# Patient Record
Sex: MALE | Race: OTHER | Employment: FULL TIME | URBAN - METROPOLITAN AREA
[De-identification: names, ages, dates, MRNs, and addresses within clinical notes are randomized per-mention and may not be internally consistent; named-entity substitution may affect disease eponyms.]

---

## 2017-01-04 ENCOUNTER — OFFICE VISIT (OUTPATIENT)
Dept: INTERNAL MEDICINE CLINIC | Facility: CLINIC | Age: 32
End: 2017-01-04

## 2017-01-04 VITALS
DIASTOLIC BLOOD PRESSURE: 99 MMHG | HEART RATE: 123 BPM | TEMPERATURE: 98 F | BODY MASS INDEX: 35 KG/M2 | WEIGHT: 239.19 LBS | SYSTOLIC BLOOD PRESSURE: 141 MMHG

## 2017-01-04 DIAGNOSIS — R07.89 ATYPICAL CHEST PAIN: ICD-10-CM

## 2017-01-04 DIAGNOSIS — K21.9 GASTROESOPHAGEAL REFLUX DISEASE, ESOPHAGITIS PRESENCE NOT SPECIFIED: Primary | ICD-10-CM

## 2017-01-04 PROCEDURE — 99214 OFFICE O/P EST MOD 30 MIN: CPT | Performed by: INTERNAL MEDICINE

## 2017-01-04 PROCEDURE — 99212 OFFICE O/P EST SF 10 MIN: CPT | Performed by: INTERNAL MEDICINE

## 2017-01-04 RX ORDER — RANITIDINE 300 MG/1
300 TABLET ORAL NIGHTLY
Qty: 30 TABLET | Refills: 5 | Status: SHIPPED | OUTPATIENT
Start: 2017-01-04 | End: 2017-02-01

## 2017-01-04 RX ORDER — PANTOPRAZOLE SODIUM 40 MG/1
40 TABLET, DELAYED RELEASE ORAL
Qty: 30 TABLET | Refills: 5 | Status: SHIPPED | OUTPATIENT
Start: 2017-01-04 | End: 2017-02-01

## 2017-01-04 NOTE — PROGRESS NOTES
HPI:    Patient ID: Jony Norman is a 33year old male. Chest Pressure  This is a new problem. The current episode started more than 1 month ago. The problem occurs every several days. The problem has been unchanged.  Pertinent negatives include no abd every morning before breakfast. Disp: 30 tablet Rfl: 5   Metoprolol Succinate  MG Oral Tablet 24 Hr Take 1 tablet (100 mg total) by mouth daily. Disp: 30 tablet Rfl: 11   lisinopril 20 MG Oral Tab Take 1 tablet (20 mg total) by mouth daily.  Disp: 30 Patient has tried taking Prevacid without any improvement. On exam: Physical exam unremarkable. Chest pain due to GERD. Plan:  -Prescribed Pantoprazole sodium 40 mg for initial treatment.  Patient was instructed to follow up if no improvement.   -Follow

## 2017-02-01 ENCOUNTER — OFFICE VISIT (OUTPATIENT)
Dept: INTERNAL MEDICINE CLINIC | Facility: CLINIC | Age: 32
End: 2017-02-01

## 2017-02-01 VITALS
WEIGHT: 234.19 LBS | HEART RATE: 108 BPM | BODY MASS INDEX: 35 KG/M2 | DIASTOLIC BLOOD PRESSURE: 83 MMHG | SYSTOLIC BLOOD PRESSURE: 129 MMHG | TEMPERATURE: 98 F

## 2017-02-01 DIAGNOSIS — I10 ESSENTIAL HYPERTENSION WITH GOAL BLOOD PRESSURE LESS THAN 130/80: ICD-10-CM

## 2017-02-01 DIAGNOSIS — I10 ESSENTIAL HYPERTENSION: Primary | ICD-10-CM

## 2017-02-01 DIAGNOSIS — K21.9 GASTROESOPHAGEAL REFLUX DISEASE, ESOPHAGITIS PRESENCE NOT SPECIFIED: ICD-10-CM

## 2017-02-01 PROCEDURE — 99212 OFFICE O/P EST SF 10 MIN: CPT | Performed by: INTERNAL MEDICINE

## 2017-02-01 PROCEDURE — 99214 OFFICE O/P EST MOD 30 MIN: CPT | Performed by: INTERNAL MEDICINE

## 2017-02-01 RX ORDER — LISINOPRIL 20 MG/1
20 TABLET ORAL DAILY
Qty: 90 TABLET | Refills: 3 | Status: SHIPPED | OUTPATIENT
Start: 2017-02-01 | End: 2017-05-18

## 2017-02-01 RX ORDER — RANITIDINE 300 MG/1
300 TABLET ORAL NIGHTLY
Qty: 90 TABLET | Refills: 3 | Status: SHIPPED | OUTPATIENT
Start: 2017-02-01 | End: 2018-02-02 | Stop reason: ALTCHOICE

## 2017-02-01 RX ORDER — PANTOPRAZOLE SODIUM 40 MG/1
40 TABLET, DELAYED RELEASE ORAL
Qty: 90 TABLET | Refills: 3 | Status: SHIPPED | OUTPATIENT
Start: 2017-02-01 | End: 2018-02-02 | Stop reason: ALTCHOICE

## 2017-02-01 RX ORDER — METOPROLOL SUCCINATE 100 MG/1
100 TABLET, EXTENDED RELEASE ORAL DAILY
Qty: 90 TABLET | Refills: 3 | Status: SHIPPED | OUTPATIENT
Start: 2017-02-01 | End: 2017-03-17

## 2017-02-01 NOTE — PROGRESS NOTES
HPI:    Patient ID: Elke Delgado is a 33year old male. Gastro-esophageal Reflux  He reports no abdominal pain, no chest pain or no heartburn. This is a chronic problem.  The current episode started more than 1 year ago (Diagnosed last visit on 1/4/201 Pantoprazole Sodium 40 MG Oral Tab EC Take 1 tablet (40 mg total) by mouth every morning before breakfast. Disp: 90 tablet Rfl: 3   Metoprolol Succinate  MG Oral Tablet 24 Hr Take 1 tablet (100 mg total) by mouth daily.  Disp: 90 tablet Rfl: 3   lis Meds This Visit:  Signed Prescriptions Disp Refills    RaNITidine HCl 300 MG Oral Tab 90 tablet 3      Sig: Take 1 tablet (300 mg total) by mouth nightly.       Pantoprazole Sodium 40 MG Oral Tab EC 90 tablet 3      Sig: Take 1 tablet (40 mg total) by m

## 2017-03-16 ENCOUNTER — TELEPHONE (OUTPATIENT)
Dept: INTERNAL MEDICINE CLINIC | Facility: CLINIC | Age: 32
End: 2017-03-16

## 2017-03-16 DIAGNOSIS — I10 ESSENTIAL HYPERTENSION WITH GOAL BLOOD PRESSURE LESS THAN 130/80: Primary | ICD-10-CM

## 2017-03-16 NOTE — TELEPHONE ENCOUNTER
Pharm has already transferred the RX to the new Location   [facility]   Pharm want to verify the directions   Current Outpatient Prescriptions:  Metoprolol Succinate  MG Oral Tablet 24 Hr Take 1 tablet (100 mg total) by mouth daily.  Disp: 90 tablet Rfl: 3

## 2017-03-17 RX ORDER — METOPROLOL SUCCINATE 100 MG/1
100 TABLET, EXTENDED RELEASE ORAL DAILY
Qty: 90 TABLET | Refills: 3 | Status: SHIPPED | OUTPATIENT
Start: 2017-03-17 | End: 2017-07-21

## 2017-05-16 DIAGNOSIS — I10 ESSENTIAL HYPERTENSION WITH GOAL BLOOD PRESSURE LESS THAN 130/80: Primary | ICD-10-CM

## 2017-05-17 ENCOUNTER — TELEPHONE (OUTPATIENT)
Dept: INTERNAL MEDICINE CLINIC | Facility: CLINIC | Age: 32
End: 2017-05-17

## 2017-05-17 RX ORDER — LISINOPRIL 20 MG/1
TABLET ORAL
Qty: 30 TABLET | Refills: 1 | OUTPATIENT
Start: 2017-05-17

## 2017-05-17 NOTE — TELEPHONE ENCOUNTER
Patient reports that his [facility] has informed him that he has no refills remaining. He had script transferred from [place], but there appears to be a problem with the transfer as only a portion of the refills were available to use.  Please ass

## 2017-05-17 NOTE — TELEPHONE ENCOUNTER
Patient states that at [facility] it has  and he does not have no medication there   Please call [facility] for his blood pressure medication  He has only 1 pill left  Please call [facility] for medication below

## 2017-05-18 RX ORDER — LISINOPRIL 20 MG/1
20 TABLET ORAL DAILY
Qty: 90 TABLET | Refills: 3 | Status: SHIPPED | OUTPATIENT
Start: 2017-05-18 | End: 2017-10-06

## 2017-07-03 ENCOUNTER — HOSPITAL ENCOUNTER (OUTPATIENT)
Age: 32
Discharge: HOME | End: 2017-07-03
Attending: EMERGENCY MEDICINE
Payer: COMMERCIAL

## 2017-07-03 VITALS
OXYGEN SATURATION: 97 % | SYSTOLIC BLOOD PRESSURE: 137 MMHG | RESPIRATION RATE: 16 BRPM | WEIGHT: 225 LBS | BODY MASS INDEX: 33 KG/M2 | DIASTOLIC BLOOD PRESSURE: 88 MMHG | TEMPERATURE: 99 F | HEART RATE: 105 BPM

## 2017-07-03 DIAGNOSIS — J02.9 ACUTE VIRAL PHARYNGITIS: ICD-10-CM

## 2017-07-03 DIAGNOSIS — J06.9 UPPER RESPIRATORY TRACT INFECTION, UNSPECIFIED TYPE: Primary | ICD-10-CM

## 2017-07-03 LAB — S PYO AG THROAT QL: NEGATIVE

## 2017-07-03 PROCEDURE — 87430 STREP A AG IA: CPT

## 2017-07-03 PROCEDURE — 99213 OFFICE O/P EST LOW 20 MIN: CPT

## 2017-07-03 PROCEDURE — 99214 OFFICE O/P EST MOD 30 MIN: CPT

## 2017-07-03 RX ORDER — CODEINE PHOSPHATE AND GUAIFENESIN 10; 100 MG/5ML; MG/5ML
5 SOLUTION ORAL EVERY 6 HOURS PRN
Qty: 120 ML | Refills: 0 | Status: SHIPPED | OUTPATIENT
Start: 2017-07-03 | End: 2017-07-07 | Stop reason: ALTCHOICE

## 2017-07-03 RX ORDER — CETIRIZINE HYDROCHLORIDE 10 MG/1
10 TABLET ORAL DAILY
Qty: 30 TABLET | Refills: 0 | Status: SHIPPED | OUTPATIENT
Start: 2017-07-03 | End: 2017-07-12

## 2017-07-03 NOTE — ED INITIAL ASSESSMENT (HPI)
Pt here to IC with c/o sore, itching throat for 3 days. Pt also states he has a cough with  greenish phlegm. Resp easy and regular. Pt denies any fever.

## 2017-07-03 NOTE — ED PROVIDER NOTES
Patient Seen in: [facility] [place]    History   No chief complaint on file. Stated Complaint: sore throat    HPI    58-year-old male presents for complaint of sore throat and cough for the past 3 days.   Patient states that c sore throat. Eyes: Negative. Respiratory: Positive for cough. Cardiovascular: Negative. Gastrointestinal: Negative. Genitourinary: Negative. Musculoskeletal: Negative. Skin: Negative. Neurological: Negative.     All other systems rev Course  ------------------------------------------------------------  MDM    Patient is non-toxic, well hydrated, tolerating PO and in no respiratory distress. Airway is patent and patient is tolerating secretions without difficulty. Rapid strep is neg.

## 2017-07-03 NOTE — ED NOTES
Home instructions given to Pt. Pt verbalizes understanding of home care and follow up care. Rx x2 given to pt.

## 2017-07-07 ENCOUNTER — OFFICE VISIT (OUTPATIENT)
Dept: INTERNAL MEDICINE CLINIC | Facility: CLINIC | Age: 32
End: 2017-07-07

## 2017-07-07 ENCOUNTER — TELEPHONE (OUTPATIENT)
Dept: INTERNAL MEDICINE CLINIC | Facility: CLINIC | Age: 32
End: 2017-07-07

## 2017-07-07 VITALS
OXYGEN SATURATION: 98 % | HEART RATE: 99 BPM | SYSTOLIC BLOOD PRESSURE: 134 MMHG | TEMPERATURE: 98 F | DIASTOLIC BLOOD PRESSURE: 91 MMHG | WEIGHT: 238.63 LBS | BODY MASS INDEX: 35 KG/M2

## 2017-07-07 DIAGNOSIS — J06.9 ACUTE URI: Primary | ICD-10-CM

## 2017-07-07 PROCEDURE — 99214 OFFICE O/P EST MOD 30 MIN: CPT | Performed by: INTERNAL MEDICINE

## 2017-07-07 PROCEDURE — 99212 OFFICE O/P EST SF 10 MIN: CPT | Performed by: INTERNAL MEDICINE

## 2017-07-07 RX ORDER — AZITHROMYCIN 250 MG/1
TABLET, FILM COATED ORAL
Qty: 1 PACKAGE | Refills: 0 | Status: SHIPPED | OUTPATIENT
Start: 2017-07-07 | End: 2017-07-12 | Stop reason: ALTCHOICE

## 2017-07-07 NOTE — PROGRESS NOTES
HPI:    Patient ID: Carter Cespedes is a 27year old male. Cough   This is a new problem. The current episode started in the past 7 days. The problem has been unchanged. The cough is non-productive. Associated symptoms include a sore throat.  Pertinent ne Tab Take 1 tablet (300 mg total) by mouth nightly.  Disp: 90 tablet Rfl: 3   Pantoprazole Sodium 40 MG Oral Tab EC Take 1 tablet (40 mg total) by mouth every morning before breakfast. Disp: 90 tablet Rfl: 3     Allergies:No Known Allergies   PHYSICAL EXAM: made by the scribe were at my direction and in my presence. I have reviewed the chart and discharge instructions (if applicable) and agree that the record reflects my personal performance and is accurate and complete.   Kay Mares MD, 7/7/2017, 10:09 AM

## 2017-07-07 NOTE — TELEPHONE ENCOUNTER
Pt had bad cough, Not getting better,seen at urgent care last monday. Hard to breath. Like to be seen today. Pl advise.

## 2017-07-08 NOTE — TELEPHONE ENCOUNTER
Chart notes that pt was seen in the office by Dr. Zachariah Zamora yesterday and treated with Z-pack. No further action required at present.

## 2017-07-11 ENCOUNTER — TELEPHONE (OUTPATIENT)
Dept: FAMILY MEDICINE CLINIC | Facility: CLINIC | Age: 32
End: 2017-07-11

## 2017-07-11 NOTE — TELEPHONE ENCOUNTER
Actions Requested: Pt requesting rx to help with cough. Cough, noticed Right eye is becoming red and feels pain when coughing. Having problems sleeping. Green to cloudy white phlegm. States after coughing has a runny nose for a couple minutes.  Pt states ha sounds very sick or weak to the triager  * Coughed up > 1 tablespoon (15 ml) blood (Exception: blood-tinged sputum)  * Fever > 103 F (39.4 C)  * Fever > 100.5 F (38.1 C) and over 61years of age  * Fever > 100.5 F (38.1 C) and has diabetes mellitus or a we

## 2017-07-12 ENCOUNTER — OFFICE VISIT (OUTPATIENT)
Dept: INTERNAL MEDICINE CLINIC | Facility: CLINIC | Age: 32
End: 2017-07-12

## 2017-07-12 VITALS
SYSTOLIC BLOOD PRESSURE: 127 MMHG | TEMPERATURE: 97 F | HEIGHT: 69 IN | RESPIRATION RATE: 16 BRPM | DIASTOLIC BLOOD PRESSURE: 83 MMHG | BODY MASS INDEX: 35.55 KG/M2 | HEART RATE: 86 BPM | WEIGHT: 240 LBS

## 2017-07-12 DIAGNOSIS — J20.9 ACUTE BRONCHITIS, UNSPECIFIED ORGANISM: Primary | ICD-10-CM

## 2017-07-12 DIAGNOSIS — R09.81 NASAL CONGESTION: ICD-10-CM

## 2017-07-12 PROBLEM — I10 ESSENTIAL HYPERTENSION: Status: ACTIVE | Noted: 2017-07-12

## 2017-07-12 PROCEDURE — 99213 OFFICE O/P EST LOW 20 MIN: CPT | Performed by: STUDENT IN AN ORGANIZED HEALTH CARE EDUCATION/TRAINING PROGRAM

## 2017-07-12 RX ORDER — BENZONATATE 200 MG/1
200 CAPSULE ORAL 3 TIMES DAILY PRN
Qty: 30 CAPSULE | Refills: 0 | Status: SHIPPED | OUTPATIENT
Start: 2017-07-12 | End: 2017-10-20

## 2017-07-12 RX ORDER — PREDNISONE 20 MG/1
20 TABLET ORAL 2 TIMES DAILY
Qty: 14 TABLET | Refills: 0 | Status: SHIPPED | OUTPATIENT
Start: 2017-07-12 | End: 2017-07-19

## 2017-07-12 RX ORDER — FLUTICASONE PROPIONATE 50 MCG
1 SPRAY, SUSPENSION (ML) NASAL DAILY
Qty: 1 BOTTLE | Refills: 0 | Status: SHIPPED | OUTPATIENT
Start: 2017-07-12 | End: 2017-07-19

## 2017-07-12 NOTE — PROGRESS NOTES
HPI:    Patient ID: Moe George is a 27year old male. Cough   This is a new problem. Episode onset: for the past 2 weeks. The problem has been unchanged. The problem occurs every few hours. The cough is productive of sputum.  Associated symptoms incl tablet (300 mg total) by mouth nightly.  Disp: 90 tablet Rfl: 3   Pantoprazole Sodium 40 MG Oral Tab EC Take 1 tablet (40 mg total) by mouth every morning before breakfast. Disp: 90 tablet Rfl: 3     Allergies:No Known Allergies   PHYSICAL EXAM:   Physical effects, risk and benefits discussed in length. After visit instructions are provided to the patient. The patient indicates understanding of these issues and agrees to the plan. The patient is asked to return if symptoms persist or worsen.     No orders o

## 2017-07-12 NOTE — TELEPHONE ENCOUNTER
Pt very upset that he has not heard back from the doctor and that doctor won't answer his calls. Appt made with Dr. Elvira Jaimes for today. Pt unable to sleep due to cough. Pt requesting number for pride line. Number given.

## 2017-07-20 ENCOUNTER — TELEPHONE (OUTPATIENT)
Dept: INTERNAL MEDICINE CLINIC | Facility: CLINIC | Age: 32
End: 2017-07-20

## 2017-07-20 NOTE — TELEPHONE ENCOUNTER
Pt was seen on 7/12/17 for bronchitis - has been taking medication but is still coughing and having chest pain.  Please c/b to advise treatment options

## 2017-07-21 ENCOUNTER — OFFICE VISIT (OUTPATIENT)
Dept: INTERNAL MEDICINE CLINIC | Facility: CLINIC | Age: 32
End: 2017-07-21

## 2017-07-21 VITALS
DIASTOLIC BLOOD PRESSURE: 84 MMHG | OXYGEN SATURATION: 97 % | SYSTOLIC BLOOD PRESSURE: 118 MMHG | HEIGHT: 69 IN | HEART RATE: 105 BPM | WEIGHT: 241 LBS | BODY MASS INDEX: 35.7 KG/M2 | RESPIRATION RATE: 16 BRPM | TEMPERATURE: 99 F

## 2017-07-21 DIAGNOSIS — I10 ESSENTIAL HYPERTENSION WITH GOAL BLOOD PRESSURE LESS THAN 130/80: ICD-10-CM

## 2017-07-21 DIAGNOSIS — J40 BRONCHITIS: Primary | ICD-10-CM

## 2017-07-21 PROCEDURE — 99214 OFFICE O/P EST MOD 30 MIN: CPT | Performed by: STUDENT IN AN ORGANIZED HEALTH CARE EDUCATION/TRAINING PROGRAM

## 2017-07-21 RX ORDER — METOPROLOL SUCCINATE 25 MG/1
25 TABLET, EXTENDED RELEASE ORAL DAILY
Qty: 30 TABLET | Refills: 0 | Status: SHIPPED | OUTPATIENT
Start: 2017-07-21 | End: 2017-09-01

## 2017-07-21 RX ORDER — BUDESONIDE AND FORMOTEROL FUMARATE DIHYDRATE 80; 4.5 UG/1; UG/1
2 AEROSOL RESPIRATORY (INHALATION) 2 TIMES DAILY
Qty: 1 INHALER | Refills: 0 | COMMUNITY
Start: 2017-07-21 | End: 2017-07-28

## 2017-07-21 NOTE — PROGRESS NOTES
HPI:    Patient ID: Samreen Faustin is a 27year old male. Cough   This is a chronic problem. The current episode started 1 to 4 weeks ago. The problem has been waxing and waning. The problem occurs hourly. The cough is non-productive.  Pertinent negative Negative. Genitourinary: Negative. Musculoskeletal: Negative for arthralgias and myalgias. Skin: Negative. Allergic/Immunologic: Negative. Negative for environmental allergies. Neurological: Negative. Hematological: Negative.     Psychiatri He has normal reflexes. Skin: Skin is warm and dry. Psychiatric: He has a normal mood and affect. His behavior is normal. Judgment and thought content normal.   Nursing note and vitals reviewed.              ASSESSMENT/PLAN:   Bronchitis  (primary encou

## 2017-07-21 NOTE — TELEPHONE ENCOUNTER
Spoke with patient and he c/o productive cough that is not getting better, chest congestion, SOB and difficultybreathing when he gets \"cough attacks\". Patient is also c/o abdominal pain, but thinks it might be due to the medications he has been taking.

## 2017-08-02 ENCOUNTER — OFFICE VISIT (OUTPATIENT)
Dept: INTERNAL MEDICINE CLINIC | Facility: CLINIC | Age: 32
End: 2017-08-02

## 2017-08-02 ENCOUNTER — HOSPITAL ENCOUNTER (OUTPATIENT)
Dept: GENERAL RADIOLOGY | Age: 32
Discharge: HOME | End: 2017-08-02
Attending: STUDENT IN AN ORGANIZED HEALTH CARE EDUCATION/TRAINING PROGRAM
Payer: COMMERCIAL

## 2017-08-02 VITALS
OXYGEN SATURATION: 98 % | HEART RATE: 90 BPM | TEMPERATURE: 98 F | HEIGHT: 69 IN | BODY MASS INDEX: 35.7 KG/M2 | RESPIRATION RATE: 16 BRPM | SYSTOLIC BLOOD PRESSURE: 124 MMHG | DIASTOLIC BLOOD PRESSURE: 86 MMHG | WEIGHT: 241 LBS

## 2017-08-02 DIAGNOSIS — R07.89 ACUTE CHEST WALL PAIN: ICD-10-CM

## 2017-08-02 DIAGNOSIS — Z23 NEED FOR DIPHTHERIA-TETANUS-PERTUSSIS (TDAP) VACCINE, ADULT/ADOLESCENT: ICD-10-CM

## 2017-08-02 DIAGNOSIS — R07.89 ACUTE CHEST WALL PAIN: Primary | ICD-10-CM

## 2017-08-02 DIAGNOSIS — M94.0 COSTOCHONDRITIS, ACUTE: ICD-10-CM

## 2017-08-02 PROCEDURE — 90471 IMMUNIZATION ADMIN: CPT | Performed by: STUDENT IN AN ORGANIZED HEALTH CARE EDUCATION/TRAINING PROGRAM

## 2017-08-02 PROCEDURE — 99213 OFFICE O/P EST LOW 20 MIN: CPT | Performed by: STUDENT IN AN ORGANIZED HEALTH CARE EDUCATION/TRAINING PROGRAM

## 2017-08-02 PROCEDURE — 90715 TDAP VACCINE 7 YRS/> IM: CPT | Performed by: STUDENT IN AN ORGANIZED HEALTH CARE EDUCATION/TRAINING PROGRAM

## 2017-08-02 PROCEDURE — 71020 XR CHEST PA + LAT CHEST (CPT=71020): CPT | Performed by: STUDENT IN AN ORGANIZED HEALTH CARE EDUCATION/TRAINING PROGRAM

## 2017-08-02 RX ORDER — NAPROXEN 500 MG/1
500 TABLET ORAL 2 TIMES DAILY WITH MEALS
Qty: 14 TABLET | Refills: 0 | Status: SHIPPED | OUTPATIENT
Start: 2017-08-02 | End: 2018-02-02 | Stop reason: ALTCHOICE

## 2017-08-02 NOTE — PATIENT INSTRUCTIONS
Chest Wall Pain: Costochondritis    The chest pain that you have had today is caused by costochondritis. This condition is caused by an inflammation of the cartilage joining your ribs to your breastbone.  It is not caused by heart or lung problems. The in · A change in the type of pain. Call if it feels different, becomes more serious, lasts longer, or spreads into your shoulder, arm, neck, jaw, or back.   · Shortness of breath or pain gets worse when you breathe  · Weakness, dizziness, or fainting  · Cough

## 2017-08-02 NOTE — PROGRESS NOTES
[facility]    REASON FOR VISIT:    Current Complaints: Patient presents with:  Hypertension: 2 wk f/u  Chest Pain: Upper Right side      HPI:  J Carlos Deleon is a 27year old male who presents for right sided chest wall pain for the past 2 weeks, Negative for arthralgias and gait problem. Right side chest wall pain. Skin: Negative for color change and rash.      EXAM:   /86   Pulse 90   Temp 98 °F (36.7 °C) (Oral)   Resp 16   Ht 69\"   Wt 241 lb   SpO2 98%   BMI 35.59 kg/m²    Wt Readings fro total) by mouth 2 (two) times daily with meals.     Need for diphtheria-tetanus-pertussis (Tdap) vaccine, adult/adolescent  -     TETANUS, DIPHTHERIA TOXOIDS AND ACELLULAR PERTUSIS VACCINE (TDAP), >7 YEARS, IM USE      Medications side effects, risk and christiana

## 2017-09-01 DIAGNOSIS — I10 ESSENTIAL HYPERTENSION WITH GOAL BLOOD PRESSURE LESS THAN 130/80: ICD-10-CM

## 2017-09-01 RX ORDER — METOPROLOL SUCCINATE 25 MG/1
25 TABLET, EXTENDED RELEASE ORAL DAILY
Qty: 30 TABLET | Refills: 0 | Status: SHIPPED | OUTPATIENT
Start: 2017-09-01 | End: 2017-10-01

## 2017-09-01 NOTE — TELEPHONE ENCOUNTER
Pt asking for refill of Metoprolol Succinate ER 25 MG - send to [facility] on file.

## 2017-10-01 DIAGNOSIS — I10 ESSENTIAL HYPERTENSION WITH GOAL BLOOD PRESSURE LESS THAN 130/80: ICD-10-CM

## 2017-10-02 RX ORDER — METOPROLOL SUCCINATE 25 MG/1
TABLET, EXTENDED RELEASE ORAL
Qty: 30 TABLET | Refills: 0 | Status: SHIPPED | OUTPATIENT
Start: 2017-10-02 | End: 2017-11-04

## 2017-10-02 NOTE — TELEPHONE ENCOUNTER
Rx sent for 1 month per failed protocol. The pt did state is completely out of the metoprolol. Pt was reminded to schedule an appointment. An appointment was scheduled on 10/6/2017 with Dr. Dale Boyer at 8:00.

## 2017-10-06 ENCOUNTER — OFFICE VISIT (OUTPATIENT)
Dept: INTERNAL MEDICINE CLINIC | Facility: CLINIC | Age: 32
End: 2017-10-06

## 2017-10-06 VITALS
RESPIRATION RATE: 16 BRPM | HEART RATE: 88 BPM | DIASTOLIC BLOOD PRESSURE: 88 MMHG | TEMPERATURE: 98 F | HEIGHT: 69 IN | BODY MASS INDEX: 35.99 KG/M2 | SYSTOLIC BLOOD PRESSURE: 126 MMHG | OXYGEN SATURATION: 98 % | WEIGHT: 243 LBS

## 2017-10-06 DIAGNOSIS — I10 ESSENTIAL HYPERTENSION WITH GOAL BLOOD PRESSURE LESS THAN 130/80: Primary | ICD-10-CM

## 2017-10-06 DIAGNOSIS — Z23 NEED FOR INFLUENZA VACCINATION: ICD-10-CM

## 2017-10-06 DIAGNOSIS — R05.8 UPPER AIRWAY COUGH SYNDROME: ICD-10-CM

## 2017-10-06 PROCEDURE — 90686 IIV4 VACC NO PRSV 0.5 ML IM: CPT | Performed by: STUDENT IN AN ORGANIZED HEALTH CARE EDUCATION/TRAINING PROGRAM

## 2017-10-06 PROCEDURE — 99214 OFFICE O/P EST MOD 30 MIN: CPT | Performed by: STUDENT IN AN ORGANIZED HEALTH CARE EDUCATION/TRAINING PROGRAM

## 2017-10-06 PROCEDURE — 90471 IMMUNIZATION ADMIN: CPT | Performed by: STUDENT IN AN ORGANIZED HEALTH CARE EDUCATION/TRAINING PROGRAM

## 2017-10-06 RX ORDER — LISINOPRIL 10 MG/1
10 TABLET ORAL DAILY
Qty: 30 TABLET | Refills: 0 | Status: SHIPPED | OUTPATIENT
Start: 2017-10-06 | End: 2017-11-06

## 2017-10-06 RX ORDER — LORATADINE 10 MG/1
10 TABLET ORAL DAILY
Qty: 30 TABLET | Refills: 0 | Status: SHIPPED | OUTPATIENT
Start: 2017-10-06 | End: 2019-03-18 | Stop reason: ALTCHOICE

## 2017-10-06 NOTE — PATIENT INSTRUCTIONS
Taking Your Blood Pressure  Blood pressure is the force of blood against the artery wall as it moves from the heart through the blood vessels. You can take your own blood pressure reading using a digital monitor.  Take readings as often as your healthcare · Write down your blood pressure numbers for each reading. Note the date and time. Keep your results in one place, such as a notebook. Even if your monitor has a built-in memory, keep a hard copy of the readings. · Remove the cuff from your arm.  Turn off A cough that is worse at night may be due to postnasal drip. Excess mucus in the nose drains from the back of your nose to your throat. This triggers the cough reflex. Postnasal drip may be due to a sinus infection or allergy.  Common allergens include dust The esophagus is the tube that carries food from the mouth to the stomach. A valve at its lower end prevents stomach acids from flowing upward. If this valve does not work properly, acid from the stomach enters the esophagus.  This may cause a burning pain

## 2017-10-06 NOTE — PROGRESS NOTES
[facility]    REASON FOR VISIT:    Current Complaints: Patient presents with:  Blood Pressure      HPI:  J Carlos Deleon is a 27year old male who presents for HTN f/u.  He was monitoring his BP at home, numbers reported [id number]'U systolic and 46-3 nausea, vomiting, abdominal pain and diarrhea. Genitourinary: Negative for urgency, frequency and difficulty urinating. Musculoskeletal: Negative for arthralgias and gait problem. Skin: Negative for color change and rash.      EXAM:   /88   Puls Tab; Take 1 tablet (10 mg total) by mouth daily. Upper airway cough syndrome  Trial of OTC antihistamine, continue Flonase.   Cannot completely rule out Lisinopril as a cause of his cough,   patient was on this ACEI for about 2 years now and cough starte

## 2017-10-20 ENCOUNTER — OFFICE VISIT (OUTPATIENT)
Dept: INTERNAL MEDICINE CLINIC | Facility: CLINIC | Age: 32
End: 2017-10-20

## 2017-10-20 VITALS
BODY MASS INDEX: 35.99 KG/M2 | WEIGHT: 243 LBS | OXYGEN SATURATION: 98 % | RESPIRATION RATE: 16 BRPM | SYSTOLIC BLOOD PRESSURE: 122 MMHG | DIASTOLIC BLOOD PRESSURE: 78 MMHG | HEART RATE: 88 BPM | HEIGHT: 69 IN | TEMPERATURE: 98 F

## 2017-10-20 DIAGNOSIS — E55.9 VITAMIN D DEFICIENCY: ICD-10-CM

## 2017-10-20 DIAGNOSIS — Z13.0 SCREENING FOR IRON DEFICIENCY ANEMIA: ICD-10-CM

## 2017-10-20 DIAGNOSIS — Z13.1 SCREENING FOR DIABETES MELLITUS: ICD-10-CM

## 2017-10-20 DIAGNOSIS — Z13.89 SCREENING FOR GENITOURINARY CONDITION: ICD-10-CM

## 2017-10-20 DIAGNOSIS — I10 ESSENTIAL HYPERTENSION: Primary | ICD-10-CM

## 2017-10-20 DIAGNOSIS — Z13.29 SCREENING FOR THYROID DISORDER: ICD-10-CM

## 2017-10-20 DIAGNOSIS — Z13.220 SCREENING FOR LIPOID DISORDERS: ICD-10-CM

## 2017-10-20 DIAGNOSIS — Z13.228 SCREENING FOR METABOLIC DISORDER: ICD-10-CM

## 2017-10-20 PROCEDURE — 99213 OFFICE O/P EST LOW 20 MIN: CPT | Performed by: STUDENT IN AN ORGANIZED HEALTH CARE EDUCATION/TRAINING PROGRAM

## 2017-10-20 NOTE — PATIENT INSTRUCTIONS
Eating Heart-Healthy Foods  Eating has a big impact on your heart health. In fact, eating healthier can improve several of your heart risks at once. For instance, it helps you manage weight, cholesterol, and blood pressure.  Here are ideas to help you mingo Aim to make these foods staples of your diet. If you have diabetes, you may have different recommendations than what is listed here:  · Fruits and vegetable provide plenty of nutrients without a lot of calories.  At meals, fill half your plate with these fo · Choose ingredients that spice up your food without adding calories, fat, or sodium. Try these items: horseradish, hot sauce, lemon, mustard, nonfat salad dressings, and vinegar.  For salt-free herbs and spices, try basil, cilantro, cinnamon, pepper, and r

## 2017-10-20 NOTE — PROGRESS NOTES
[facility]    REASON FOR VISIT:    Current Complaints: Patient presents with:  Hypertension: 2 wk f/u      HPI:  Kitty Dumont is a 27year old male who presents for 1 month f/u for HTN.  At the previous visit his BP medications were adjusted wit for arthralgias and gait problem. Skin: Negative for color change and rash.      EXAM:   /78   Pulse 88   Temp 98.1 °F (36.7 °C) (Oral)   Resp 16   Ht 69\"   Wt 243 lb   SpO2 98%   BMI 35.88 kg/m²    Wt Readings from Last 6 Encounters:  10/20/17 : 2 HEMOGLOBIN A1C; Future    Screening for metabolic disorder  -     COMP METABOLIC PANEL (14); Future    Screening for iron deficiency anemia  -     CBC WITH DIFFERENTIAL WITH PLATELET;  Future    Vitamin D deficiency  -     VITAMIN D, 25-HYDROXY; Future    S

## 2017-11-04 DIAGNOSIS — I10 ESSENTIAL HYPERTENSION WITH GOAL BLOOD PRESSURE LESS THAN 130/80: ICD-10-CM

## 2017-11-06 DIAGNOSIS — I10 ESSENTIAL HYPERTENSION WITH GOAL BLOOD PRESSURE LESS THAN 130/80: ICD-10-CM

## 2017-11-06 RX ORDER — LISINOPRIL 10 MG/1
10 TABLET ORAL DAILY
Qty: 30 TABLET | Refills: 0 | Status: SHIPPED | OUTPATIENT
Start: 2017-11-06 | End: 2017-12-04

## 2017-11-06 RX ORDER — METOPROLOL SUCCINATE 25 MG/1
TABLET, EXTENDED RELEASE ORAL
Qty: 30 TABLET | Refills: 1 | Status: SHIPPED | OUTPATIENT
Start: 2017-11-06 | End: 2018-01-02

## 2017-11-06 NOTE — TELEPHONE ENCOUNTER
[facility]. Pt reminded of pending labs from last month.  The pt will be coming into the office for HTN FU and physical.

## 2017-12-04 DIAGNOSIS — I10 ESSENTIAL HYPERTENSION WITH GOAL BLOOD PRESSURE LESS THAN 130/80: ICD-10-CM

## 2017-12-04 RX ORDER — LISINOPRIL 10 MG/1
TABLET ORAL
Qty: 30 TABLET | Refills: 0 | Status: SHIPPED | OUTPATIENT
Start: 2017-12-04 | End: 2018-01-02

## 2018-01-02 DIAGNOSIS — I10 ESSENTIAL HYPERTENSION WITH GOAL BLOOD PRESSURE LESS THAN 130/80: ICD-10-CM

## 2018-01-02 RX ORDER — METOPROLOL SUCCINATE 25 MG/1
25 TABLET, EXTENDED RELEASE ORAL
Qty: 30 TABLET | Refills: 0 | Status: SHIPPED | OUTPATIENT
Start: 2018-01-02 | End: 2018-02-01

## 2018-01-02 RX ORDER — LISINOPRIL 10 MG/1
TABLET ORAL
Qty: 30 TABLET | Refills: 0 | Status: SHIPPED | OUTPATIENT
Start: 2018-01-02 | End: 2018-02-01

## 2018-01-02 RX ORDER — METOPROLOL SUCCINATE 25 MG/1
TABLET, EXTENDED RELEASE ORAL
Qty: 30 TABLET | Refills: 0 | Status: SHIPPED | OUTPATIENT
Start: 2018-01-02 | End: 2018-02-01

## 2018-01-31 ENCOUNTER — LABORATORY ENCOUNTER (OUTPATIENT)
Dept: LAB | Age: 33
End: 2018-01-31
Attending: STUDENT IN AN ORGANIZED HEALTH CARE EDUCATION/TRAINING PROGRAM
Payer: COMMERCIAL

## 2018-01-31 DIAGNOSIS — Z13.89 SCREENING FOR GENITOURINARY CONDITION: ICD-10-CM

## 2018-01-31 DIAGNOSIS — Z13.29 SCREENING FOR THYROID DISORDER: ICD-10-CM

## 2018-01-31 DIAGNOSIS — Z13.220 SCREENING FOR LIPOID DISORDERS: ICD-10-CM

## 2018-01-31 DIAGNOSIS — Z13.0 SCREENING FOR IRON DEFICIENCY ANEMIA: ICD-10-CM

## 2018-01-31 DIAGNOSIS — Z13.1 SCREENING FOR DIABETES MELLITUS: ICD-10-CM

## 2018-01-31 DIAGNOSIS — Z13.228 SCREENING FOR METABOLIC DISORDER: ICD-10-CM

## 2018-01-31 DIAGNOSIS — E55.9 VITAMIN D DEFICIENCY: ICD-10-CM

## 2018-01-31 LAB
25-HYDROXYVITAMIN D (TOTAL): 20.9 NG/ML (ref 30–100)
ALBUMIN SERPL-MCNC: 4.1 G/DL (ref 3.5–4.8)
ALP LIVER SERPL-CCNC: 87 U/L (ref 45–117)
ALT SERPL-CCNC: 41 U/L (ref 17–63)
AST SERPL-CCNC: 20 U/L (ref 15–41)
BASOPHILS # BLD AUTO: 0.04 X10(3) UL (ref 0–0.1)
BASOPHILS NFR BLD AUTO: 0.8 %
BILIRUB SERPL-MCNC: 0.9 MG/DL (ref 0.1–2)
BILIRUB UR QL STRIP.AUTO: NEGATIVE
BUN BLD-MCNC: 17 MG/DL (ref 8–20)
CALCIUM BLD-MCNC: 8.9 MG/DL (ref 8.3–10.3)
CHLORIDE: 105 MMOL/L (ref 101–111)
CHOLEST SMN-MCNC: 166 MG/DL (ref ?–200)
CLARITY UR REFRACT.AUTO: CLEAR
CO2: 27 MMOL/L (ref 22–32)
COLOR UR AUTO: YELLOW
CREAT BLD-MCNC: 0.93 MG/DL (ref 0.7–1.3)
EOSINOPHIL # BLD AUTO: 0.11 X10(3) UL (ref 0–0.3)
EOSINOPHIL NFR BLD AUTO: 2.2 %
ERYTHROCYTE [DISTWIDTH] IN BLOOD BY AUTOMATED COUNT: 12 % (ref 11.5–16)
EST. AVERAGE GLUCOSE BLD GHB EST-MCNC: 114 MG/DL (ref 68–126)
GLUCOSE BLD-MCNC: 95 MG/DL (ref 70–99)
GLUCOSE UR STRIP.AUTO-MCNC: NEGATIVE MG/DL
HBA1C MFR BLD HPLC: 5.6 % (ref ?–5.7)
HCT VFR BLD AUTO: 50.4 % (ref 37–53)
HDLC SERPL-MCNC: 47 MG/DL (ref 45–?)
HDLC SERPL: 3.53 {RATIO} (ref ?–4.97)
HGB BLD-MCNC: 17.4 G/DL (ref 13–17)
IMMATURE GRANULOCYTE COUNT: 0.03 X10(3) UL (ref 0–1)
IMMATURE GRANULOCYTE RATIO %: 0.6 %
KETONES UR STRIP.AUTO-MCNC: NEGATIVE MG/DL
LDLC SERPL CALC-MCNC: 106 MG/DL (ref ?–130)
LEUKOCYTE ESTERASE UR QL STRIP.AUTO: NEGATIVE
LYMPHOCYTES # BLD AUTO: 1.71 X10(3) UL (ref 0.9–4)
LYMPHOCYTES NFR BLD AUTO: 34.5 %
M PROTEIN MFR SERPL ELPH: 7.5 G/DL (ref 6.1–8.3)
MCH RBC QN AUTO: 30.9 PG (ref 27–33.2)
MCHC RBC AUTO-ENTMCNC: 34.5 G/DL (ref 31–37)
MCV RBC AUTO: 89.5 FL (ref 80–99)
MONOCYTES # BLD AUTO: 0.47 X10(3) UL (ref 0.1–0.6)
MONOCYTES NFR BLD AUTO: 9.5 %
NEUTROPHIL ABS PRELIM: 2.6 X10 (3) UL (ref 1.3–6.7)
NEUTROPHILS # BLD AUTO: 2.6 X10(3) UL (ref 1.3–6.7)
NEUTROPHILS NFR BLD AUTO: 52.4 %
NITRITE UR QL STRIP.AUTO: NEGATIVE
NONHDLC SERPL-MCNC: 119 MG/DL (ref ?–130)
PH UR STRIP.AUTO: 6 [PH] (ref 4.5–8)
PLATELET # BLD AUTO: 226 10(3)UL (ref 150–450)
POTASSIUM SERPL-SCNC: 3.8 MMOL/L (ref 3.6–5.1)
PROT UR STRIP.AUTO-MCNC: NEGATIVE MG/DL
RBC # BLD AUTO: 5.63 X10(6)UL (ref 4.3–5.7)
RBC UR QL AUTO: NEGATIVE
RED CELL DISTRIBUTION WIDTH-SD: 39.3 FL (ref 35.1–46.3)
SODIUM SERPL-SCNC: 139 MMOL/L (ref 136–144)
SP GR UR STRIP.AUTO: 1.02 (ref 1–1.03)
TRIGL SERPL-MCNC: 63 MG/DL (ref ?–150)
TSI SER-ACNC: 0.99 MIU/ML (ref 0.35–5.5)
UROBILINOGEN UR STRIP.AUTO-MCNC: <2 MG/DL
VLDLC SERPL CALC-MCNC: 13 MG/DL (ref 5–40)
WBC # BLD AUTO: 5 X10(3) UL (ref 4–13)

## 2018-01-31 PROCEDURE — 83036 HEMOGLOBIN GLYCOSYLATED A1C: CPT

## 2018-01-31 PROCEDURE — 36415 COLL VENOUS BLD VENIPUNCTURE: CPT

## 2018-01-31 PROCEDURE — 85025 COMPLETE CBC W/AUTO DIFF WBC: CPT

## 2018-01-31 PROCEDURE — 81003 URINALYSIS AUTO W/O SCOPE: CPT

## 2018-01-31 PROCEDURE — 82306 VITAMIN D 25 HYDROXY: CPT

## 2018-01-31 PROCEDURE — 80061 LIPID PANEL: CPT

## 2018-01-31 PROCEDURE — 84443 ASSAY THYROID STIM HORMONE: CPT

## 2018-01-31 PROCEDURE — 80053 COMPREHEN METABOLIC PANEL: CPT

## 2018-02-01 DIAGNOSIS — I10 ESSENTIAL HYPERTENSION WITH GOAL BLOOD PRESSURE LESS THAN 130/80: ICD-10-CM

## 2018-02-01 RX ORDER — METOPROLOL SUCCINATE 25 MG/1
25 TABLET, EXTENDED RELEASE ORAL
Qty: 90 TABLET | Refills: 0 | Status: SHIPPED | OUTPATIENT
Start: 2018-02-01 | End: 2018-05-05

## 2018-02-01 RX ORDER — LISINOPRIL 10 MG/1
10 TABLET ORAL
Qty: 90 TABLET | Refills: 0 | Status: SHIPPED | OUTPATIENT
Start: 2018-02-01 | End: 2018-05-05

## 2018-02-01 NOTE — TELEPHONE ENCOUNTER
Patient called and requested med refill on Metoprolol and Lisinopril to be refilled at [facility] in [facility]. He stated he doesn't do 90 day supply. Please call patient if needed.

## 2018-02-02 ENCOUNTER — OFFICE VISIT (OUTPATIENT)
Dept: INTERNAL MEDICINE CLINIC | Facility: CLINIC | Age: 33
End: 2018-02-02

## 2018-02-02 VITALS
SYSTOLIC BLOOD PRESSURE: 136 MMHG | HEART RATE: 80 BPM | BODY MASS INDEX: 35.55 KG/M2 | RESPIRATION RATE: 16 BRPM | TEMPERATURE: 98 F | OXYGEN SATURATION: 98 % | WEIGHT: 240 LBS | HEIGHT: 69 IN | DIASTOLIC BLOOD PRESSURE: 86 MMHG

## 2018-02-02 DIAGNOSIS — I10 ESSENTIAL HYPERTENSION: ICD-10-CM

## 2018-02-02 DIAGNOSIS — E55.9 VITAMIN D DEFICIENCY: ICD-10-CM

## 2018-02-02 DIAGNOSIS — Z00.00 ENCOUNTER FOR ANNUAL PHYSICAL EXAM: Primary | ICD-10-CM

## 2018-02-02 DIAGNOSIS — H60.501 ACUTE OTITIS EXTERNA OF RIGHT EAR, UNSPECIFIED TYPE: ICD-10-CM

## 2018-02-02 PROCEDURE — 99395 PREV VISIT EST AGE 18-39: CPT | Performed by: STUDENT IN AN ORGANIZED HEALTH CARE EDUCATION/TRAINING PROGRAM

## 2018-02-02 RX ORDER — ACETAMINOPHEN 160 MG
2000 TABLET,DISINTEGRATING ORAL DAILY
COMMUNITY
End: 2018-02-02 | Stop reason: DRUGHIGH

## 2018-02-02 RX ORDER — NEOMYCIN SULFATE, POLYMYXIN B SULFATE AND HYDROCORTISONE 10; 3.5; 1 MG/ML; MG/ML; [USP'U]/ML
4 SUSPENSION/ DROPS AURICULAR (OTIC) 4 TIMES DAILY
Qty: 1 BOTTLE | Refills: 0 | Status: SHIPPED | OUTPATIENT
Start: 2018-02-02 | End: 2018-02-09

## 2018-02-02 RX ORDER — ERGOCALCIFEROL 1.25 MG/1
50000 CAPSULE ORAL WEEKLY
Qty: 12 CAPSULE | Refills: 0 | Status: SHIPPED | OUTPATIENT
Start: 2018-02-02 | End: 2018-05-03

## 2018-02-02 NOTE — PATIENT INSTRUCTIONS
Prevention Guidelines, Men Ages 25 to 44  Screening tests and vaccines are an important part of managing your health. Health counseling is essential, too. Below are guidelines for these, for men ages 25 to 44.  Talk with your healthcare provider to make s Hepatitis B Men at increased risk for infection – talk with your healthcare provider 3 doses over 6 months; second dose should be given 1 month after the first dose; the third dose should be given at least 2 months after the second dose and at least 4 natan © 7769-8255 [employer] [address]. [address] [facility], [facility] [place]. All rights reserved. This information is not intended as a substitute for professional medical care. Always follow your healthcare professional's instructions.         Externa · If you feel water trapped in your ear, use ear drops right away. You can get these drops over the counter at most drugstores.  They work by removing water from the ear canal.  Follow-up care  Follow up with your healthcare provider in 1 week, or as advise

## 2018-02-02 NOTE — PROGRESS NOTES
[facility]    REASON FOR VISIT:    Lilly Thomas is a 27year old male who presents for an [address]. Current Complaints: Feeling well. Reports compliance with the medications, denies any side effects.   Admits to some mild disc found for: RPR    Hepatitis C Screening Screen those at high risk plus screen one time for adults born 1947-1 965 No results found for: HCVAB    Tuberculosis Screen If high risk No components found for: PPDINDURAT      ALLERGIES:   No Known Allergies    CU for nausea, vomiting, abdominal pain and diarrhea. Genitourinary: Negative for urgency, frequency and difficulty urinating. Musculoskeletal: Negative for arthralgias and gait problem. Skin: Negative for color change and rash.    Neurological: Negative CHRONIC CONDITIONS:   Naren Levy is a 27year old male who presents for an [address].    Encounter for annual physical exam  (primary encounter diagnosis)  Acute otitis externa of right ear, unspecified type  Vitamin D deficiency  Essenti and agrees to the plan.     Diet counseling perfomed  Exercise counseling perfomed    SUGGESTED VACCINATIONS - Influenza, Pneumococcal, Zoster, Tetanus     Immunization History   Administered Date(s) Administered   • Influenza Vaccine, No Preserv, 3YR + 10/

## 2018-02-15 DIAGNOSIS — K21.9 GASTROESOPHAGEAL REFLUX DISEASE, ESOPHAGITIS PRESENCE NOT SPECIFIED: ICD-10-CM

## 2018-02-15 RX ORDER — PANTOPRAZOLE SODIUM 40 MG/1
40 TABLET, DELAYED RELEASE ORAL
Qty: 30 TABLET | Refills: 5 | Status: SHIPPED | OUTPATIENT
Start: 2018-02-15 | End: 2018-08-18

## 2018-05-05 DIAGNOSIS — I10 ESSENTIAL HYPERTENSION WITH GOAL BLOOD PRESSURE LESS THAN 130/80: ICD-10-CM

## 2018-05-07 RX ORDER — METOPROLOL SUCCINATE 25 MG/1
TABLET, EXTENDED RELEASE ORAL
Qty: 30 TABLET | Refills: 5 | Status: SHIPPED | OUTPATIENT
Start: 2018-05-07 | End: 2018-11-04

## 2018-05-07 RX ORDER — LISINOPRIL 10 MG/1
TABLET ORAL
Qty: 30 TABLET | Refills: 5 | Status: SHIPPED | OUTPATIENT
Start: 2018-05-07 | End: 2018-11-04

## 2018-08-18 DIAGNOSIS — K21.9 GASTROESOPHAGEAL REFLUX DISEASE, ESOPHAGITIS PRESENCE NOT SPECIFIED: ICD-10-CM

## 2018-08-20 RX ORDER — PANTOPRAZOLE SODIUM 40 MG/1
TABLET, DELAYED RELEASE ORAL
Qty: 30 TABLET | Refills: 5 | Status: SHIPPED | OUTPATIENT
Start: 2018-08-20 | End: 2019-04-21

## 2018-09-10 ENCOUNTER — APPOINTMENT (OUTPATIENT)
Dept: GENERAL RADIOLOGY | Age: 33
End: 2018-09-10
Attending: EMERGENCY MEDICINE
Payer: COMMERCIAL

## 2018-09-10 ENCOUNTER — HOSPITAL ENCOUNTER (OUTPATIENT)
Age: 33
Discharge: HOME | End: 2018-09-10
Attending: EMERGENCY MEDICINE
Payer: COMMERCIAL

## 2018-09-10 VITALS
TEMPERATURE: 99 F | DIASTOLIC BLOOD PRESSURE: 101 MMHG | OXYGEN SATURATION: 96 % | SYSTOLIC BLOOD PRESSURE: 146 MMHG | BODY MASS INDEX: 34 KG/M2 | WEIGHT: 230 LBS | RESPIRATION RATE: 16 BRPM | HEART RATE: 98 BPM

## 2018-09-10 DIAGNOSIS — S39.011A STRAIN OF ABDOMINAL MUSCLE, INITIAL ENCOUNTER: Primary | ICD-10-CM

## 2018-09-10 LAB
BILIRUB UR QL STRIP: NEGATIVE
CLARITY UR: CLEAR
COLOR UR: YELLOW
GLUCOSE UR STRIP-MCNC: NEGATIVE MG/DL
HGB UR QL STRIP: NEGATIVE
KETONES UR STRIP-MCNC: 80 MG/DL
LEUKOCYTE ESTERASE UR QL STRIP: NEGATIVE
NITRITE UR QL STRIP: NEGATIVE
PH UR STRIP: 5.5 [PH]
PROT UR STRIP-MCNC: NEGATIVE MG/DL
SP GR UR STRIP: >=1.03
UROBILINOGEN UR STRIP-ACNC: <2 MG/DL

## 2018-09-10 PROCEDURE — 74018 RADEX ABDOMEN 1 VIEW: CPT | Performed by: EMERGENCY MEDICINE

## 2018-09-10 PROCEDURE — 99213 OFFICE O/P EST LOW 20 MIN: CPT

## 2018-09-10 PROCEDURE — 99214 OFFICE O/P EST MOD 30 MIN: CPT

## 2018-09-10 PROCEDURE — 81003 URINALYSIS AUTO W/O SCOPE: CPT

## 2018-09-10 NOTE — ED INITIAL ASSESSMENT (HPI)
Reports R side abdominal pain x 3 days. Denies nausea, vomiting or diarrhea. Pain sometimes radiates to R buttock. Denies injury.

## 2018-09-10 NOTE — ED PROVIDER NOTES
Patient Seen in: [facility] In [address] [place] [address]    History   Patient presents with:  Abdomen/Flank Pain (GI/)    Stated Complaint: abdominal pain     HPI    Patient complains of r flank and r  abdominal pain that began couple days ago.   Monse Carter Pulse 98   Temp 99 °F (37.2 °C) (Oral)   Resp 16   Wt 104.3 kg   SpO2 96%   BMI 33.97 kg/m²   Pulse ox Nl on room air          Physical Exam  General Appearance: alert, no distress  Eyes: pupils equal and round no pallor or injection  ENT, Mouth: mucous me Prescribed:  Discharge Medication List as of 9/10/2018  3:03 PM

## 2018-11-04 DIAGNOSIS — I10 ESSENTIAL HYPERTENSION WITH GOAL BLOOD PRESSURE LESS THAN 130/80: ICD-10-CM

## 2018-11-06 RX ORDER — METOPROLOL SUCCINATE 25 MG/1
TABLET, EXTENDED RELEASE ORAL
Qty: 30 TABLET | Refills: 2 | Status: SHIPPED | OUTPATIENT
Start: 2018-11-06 | End: 2019-02-01

## 2018-11-06 RX ORDER — LISINOPRIL 10 MG/1
TABLET ORAL
Qty: 30 TABLET | Refills: 2 | Status: SHIPPED | OUTPATIENT
Start: 2018-11-06 | End: 2019-02-01

## 2018-11-06 NOTE — TELEPHONE ENCOUNTER
Last OV relevant to medication: 2/2/18 physical  Last refill date: 5/7/18     #/refills: 30/5  When pt was asked to return for OV: none  Upcoming appt/reason: none

## 2019-02-01 DIAGNOSIS — I10 ESSENTIAL HYPERTENSION WITH GOAL BLOOD PRESSURE LESS THAN 130/80: ICD-10-CM

## 2019-02-01 RX ORDER — LISINOPRIL 10 MG/1
TABLET ORAL
Qty: 30 TABLET | Refills: 1 | Status: SHIPPED | OUTPATIENT
Start: 2019-02-01 | End: 2019-03-30

## 2019-02-01 RX ORDER — METOPROLOL SUCCINATE 25 MG/1
TABLET, EXTENDED RELEASE ORAL
Qty: 30 TABLET | Refills: 1 | Status: SHIPPED | OUTPATIENT
Start: 2019-02-01 | End: 2019-03-30

## 2019-02-01 NOTE — TELEPHONE ENCOUNTER
Last OV relevant to medication: 2/2/2018 - PE  Last refill date: 11/6/2018     #/refills: 30/2  When pt was asked to return for OV: None Noted - 1 yr  Upcoming appt/reason: 3/18/19 - PE    Lab Results   Component Value Date    GLU 95 01/31/2018    BUN 17 0

## 2019-03-18 ENCOUNTER — OFFICE VISIT (OUTPATIENT)
Dept: INTERNAL MEDICINE CLINIC | Facility: CLINIC | Age: 34
End: 2019-03-18
Payer: COMMERCIAL

## 2019-03-18 VITALS
BODY MASS INDEX: 35.67 KG/M2 | OXYGEN SATURATION: 99 % | HEIGHT: 69 IN | HEART RATE: 117 BPM | DIASTOLIC BLOOD PRESSURE: 82 MMHG | WEIGHT: 240.81 LBS | TEMPERATURE: 98 F | RESPIRATION RATE: 16 BRPM | SYSTOLIC BLOOD PRESSURE: 130 MMHG

## 2019-03-18 DIAGNOSIS — Z13.89 SCREENING FOR GENITOURINARY CONDITION: ICD-10-CM

## 2019-03-18 DIAGNOSIS — Z13.29 SCREENING FOR THYROID DISORDER: ICD-10-CM

## 2019-03-18 DIAGNOSIS — E55.9 VITAMIN D DEFICIENCY: ICD-10-CM

## 2019-03-18 DIAGNOSIS — Z00.00 ENCOUNTER FOR ANNUAL PHYSICAL EXAM: Primary | ICD-10-CM

## 2019-03-18 DIAGNOSIS — Z13.228 SCREENING FOR METABOLIC DISORDER: ICD-10-CM

## 2019-03-18 DIAGNOSIS — Z13.1 SCREENING FOR DIABETES MELLITUS: ICD-10-CM

## 2019-03-18 DIAGNOSIS — Z13.220 SCREENING FOR LIPOID DISORDERS: ICD-10-CM

## 2019-03-18 DIAGNOSIS — Z13.0 SCREENING FOR IRON DEFICIENCY ANEMIA: ICD-10-CM

## 2019-03-18 PROCEDURE — 99395 PREV VISIT EST AGE 18-39: CPT | Performed by: STUDENT IN AN ORGANIZED HEALTH CARE EDUCATION/TRAINING PROGRAM

## 2019-03-18 NOTE — PROGRESS NOTES
[facility]    REASON FOR VISIT:    Todd Harden is a 37year old male who presents for an [address]. Current Complaints: Reports compliance with the medications, denies any side effects  Vaccinations: Tdap 2017.   Last Health M Hepatitis C Screening Screen those at high risk plus screen one time for adults born 1947-1 965 No results found for: HCVAB    Tuberculosis Screen If high risk No components found for: PPDINDURAT      ALLERGIES:   No Known Allergies    CURRENT MEDICATION Psychiatric/Behavioral: Negative for confusion and agitation. The patient is not nervous/anxious.       EXAM:   /82 (BP Location: Left arm, Patient Position: Sitting, Cuff Size: large)   Pulse 117   Temp 98.1 °F (36.7 °C) (Oral)   Resp 16   Ht 69\" anemia  Screening for metabolic disorder  Vitamin D deficiency  Screening for lipoid disorders  Screening for thyroid disorder  Screening for diabetes mellitus      Age appropriate cancer screening, labs, safety, immunizations were discussed with the patie education done. Educated by: MD     The patient indicates understanding of these issues and agrees to the plan.     Tobacco cessation counseling perfomed  Diet counseling perfomed  Exercise counseling perfomed    SUGGESTED VACCINATIONS - Influenza, Pneumo

## 2019-03-22 ENCOUNTER — LABORATORY ENCOUNTER (OUTPATIENT)
Dept: LAB | Age: 34
End: 2019-03-22
Attending: STUDENT IN AN ORGANIZED HEALTH CARE EDUCATION/TRAINING PROGRAM
Payer: COMMERCIAL

## 2019-03-22 DIAGNOSIS — E55.9 VITAMIN D DEFICIENCY: ICD-10-CM

## 2019-03-22 DIAGNOSIS — Z13.29 SCREENING FOR THYROID DISORDER: ICD-10-CM

## 2019-03-22 DIAGNOSIS — Z13.1 SCREENING FOR DIABETES MELLITUS: ICD-10-CM

## 2019-03-22 DIAGNOSIS — Z13.0 SCREENING FOR IRON DEFICIENCY ANEMIA: ICD-10-CM

## 2019-03-22 DIAGNOSIS — Z13.89 SCREENING FOR GENITOURINARY CONDITION: ICD-10-CM

## 2019-03-22 DIAGNOSIS — Z13.220 SCREENING FOR LIPOID DISORDERS: ICD-10-CM

## 2019-03-22 DIAGNOSIS — Z13.228 SCREENING FOR METABOLIC DISORDER: ICD-10-CM

## 2019-03-22 LAB
ALBUMIN SERPL-MCNC: 4 G/DL (ref 3.4–5)
ALBUMIN/GLOB SERPL: 1.1 {RATIO} (ref 1–2)
ALP LIVER SERPL-CCNC: 105 U/L (ref 45–117)
ALT SERPL-CCNC: 42 U/L (ref 16–61)
ANION GAP SERPL CALC-SCNC: 5 MMOL/L (ref 0–18)
AST SERPL-CCNC: 21 U/L (ref 15–37)
BASOPHILS # BLD AUTO: 0.04 X10(3) UL (ref 0–0.2)
BASOPHILS NFR BLD AUTO: 0.7 %
BILIRUB SERPL-MCNC: 0.8 MG/DL (ref 0.1–2)
BILIRUB UR QL STRIP.AUTO: NEGATIVE
BUN BLD-MCNC: 15 MG/DL (ref 7–18)
BUN/CREAT SERPL: 16 (ref 10–20)
CALCIUM BLD-MCNC: 8.6 MG/DL (ref 8.5–10.1)
CHLORIDE SERPL-SCNC: 106 MMOL/L (ref 98–107)
CHOLEST SMN-MCNC: 153 MG/DL (ref ?–200)
CLARITY UR REFRACT.AUTO: CLEAR
CO2 SERPL-SCNC: 26 MMOL/L (ref 21–32)
COLOR UR AUTO: YELLOW
CREAT BLD-MCNC: 0.94 MG/DL (ref 0.7–1.3)
DEPRECATED RDW RBC AUTO: 39.5 FL (ref 35.1–46.3)
EOSINOPHIL # BLD AUTO: 0.14 X10(3) UL (ref 0–0.7)
EOSINOPHIL NFR BLD AUTO: 2.4 %
ERYTHROCYTE [DISTWIDTH] IN BLOOD BY AUTOMATED COUNT: 11.8 % (ref 11–15)
EST. AVERAGE GLUCOSE BLD GHB EST-MCNC: 117 MG/DL (ref 68–126)
GLOBULIN PLAS-MCNC: 3.5 G/DL (ref 2.8–4.4)
GLUCOSE BLD-MCNC: 103 MG/DL (ref 70–99)
GLUCOSE UR STRIP.AUTO-MCNC: NEGATIVE MG/DL
HBA1C MFR BLD HPLC: 5.7 % (ref ?–5.7)
HCT VFR BLD AUTO: 51.3 % (ref 39–53)
HDLC SERPL-MCNC: 38 MG/DL (ref 40–59)
HGB BLD-MCNC: 17.5 G/DL (ref 13–17.5)
IMM GRANULOCYTES # BLD AUTO: 0.03 X10(3) UL (ref 0–1)
IMM GRANULOCYTES NFR BLD: 0.5 %
LDLC SERPL CALC-MCNC: 105 MG/DL (ref ?–100)
LEUKOCYTE ESTERASE UR QL STRIP.AUTO: NEGATIVE
LYMPHOCYTES # BLD AUTO: 1.42 X10(3) UL (ref 1–4)
LYMPHOCYTES NFR BLD AUTO: 24.3 %
M PROTEIN MFR SERPL ELPH: 7.5 G/DL (ref 6.4–8.2)
MCH RBC QN AUTO: 31.1 PG (ref 26–34)
MCHC RBC AUTO-ENTMCNC: 34.1 G/DL (ref 31–37)
MCV RBC AUTO: 91.1 FL (ref 80–100)
MONOCYTES # BLD AUTO: 0.67 X10(3) UL (ref 0.1–1)
MONOCYTES NFR BLD AUTO: 11.5 %
NEUTROPHILS # BLD AUTO: 3.55 X10 (3) UL (ref 1.5–7.7)
NEUTROPHILS # BLD AUTO: 3.55 X10(3) UL (ref 1.5–7.7)
NEUTROPHILS NFR BLD AUTO: 60.6 %
NITRITE UR QL STRIP.AUTO: NEGATIVE
NONHDLC SERPL-MCNC: 115 MG/DL (ref ?–130)
OSMOLALITY SERPL CALC.SUM OF ELEC: 285 MOSM/KG (ref 275–295)
PH UR STRIP.AUTO: 6 [PH] (ref 4.5–8)
PLATELET # BLD AUTO: 218 10(3)UL (ref 150–450)
POTASSIUM SERPL-SCNC: 4.1 MMOL/L (ref 3.5–5.1)
PROT UR STRIP.AUTO-MCNC: NEGATIVE MG/DL
RBC # BLD AUTO: 5.63 X10(6)UL (ref 4.3–5.7)
RBC UR QL AUTO: NEGATIVE
SODIUM SERPL-SCNC: 137 MMOL/L (ref 136–145)
SP GR UR STRIP.AUTO: 1.03 (ref 1–1.03)
TRIGL SERPL-MCNC: 52 MG/DL (ref 30–149)
TSI SER-ACNC: 0.8 MIU/ML (ref 0.36–3.74)
UROBILINOGEN UR STRIP.AUTO-MCNC: <2 MG/DL
VIT D+METAB SERPL-MCNC: 26.4 NG/ML (ref 30–100)
VLDLC SERPL CALC-MCNC: 10 MG/DL (ref 0–30)
WBC # BLD AUTO: 5.9 X10(3) UL (ref 4–11)

## 2019-03-22 PROCEDURE — 80061 LIPID PANEL: CPT

## 2019-03-22 PROCEDURE — 82306 VITAMIN D 25 HYDROXY: CPT

## 2019-03-22 PROCEDURE — 81003 URINALYSIS AUTO W/O SCOPE: CPT

## 2019-03-22 PROCEDURE — 83036 HEMOGLOBIN GLYCOSYLATED A1C: CPT

## 2019-03-22 PROCEDURE — 36415 COLL VENOUS BLD VENIPUNCTURE: CPT

## 2019-03-22 PROCEDURE — 85025 COMPLETE CBC W/AUTO DIFF WBC: CPT

## 2019-03-22 PROCEDURE — 84443 ASSAY THYROID STIM HORMONE: CPT

## 2019-03-22 PROCEDURE — 80053 COMPREHEN METABOLIC PANEL: CPT

## 2019-03-30 DIAGNOSIS — I10 ESSENTIAL HYPERTENSION WITH GOAL BLOOD PRESSURE LESS THAN 130/80: ICD-10-CM

## 2019-04-01 RX ORDER — METOPROLOL SUCCINATE 25 MG/1
TABLET, EXTENDED RELEASE ORAL
Qty: 90 TABLET | Refills: 1 | Status: SHIPPED | OUTPATIENT
Start: 2019-04-01 | End: 2019-10-15

## 2019-04-01 RX ORDER — LISINOPRIL 10 MG/1
TABLET ORAL
Qty: 90 TABLET | Refills: 1 | Status: SHIPPED | OUTPATIENT
Start: 2019-04-01 | End: 2019-10-15

## 2019-04-21 DIAGNOSIS — K21.9 GASTROESOPHAGEAL REFLUX DISEASE, ESOPHAGITIS PRESENCE NOT SPECIFIED: ICD-10-CM

## 2019-04-22 RX ORDER — PANTOPRAZOLE SODIUM 40 MG/1
TABLET, DELAYED RELEASE ORAL
Qty: 30 TABLET | Refills: 11 | Status: SHIPPED | OUTPATIENT
Start: 2019-04-22 | End: 2020-07-14

## 2019-04-22 NOTE — TELEPHONE ENCOUNTER
Last OV relevant to medication: 3/18/19  Last refill date: 8/20/18     #/refills: 30/5  When pt was asked to return for OV: 1 year  Upcoming appt/reason: 3/23/2020, PE

## 2019-05-09 ENCOUNTER — OFFICE VISIT (OUTPATIENT)
Dept: INTERNAL MEDICINE CLINIC | Facility: CLINIC | Age: 34
End: 2019-05-09
Payer: COMMERCIAL

## 2019-05-09 VITALS
HEIGHT: 69 IN | DIASTOLIC BLOOD PRESSURE: 82 MMHG | HEART RATE: 105 BPM | TEMPERATURE: 98 F | RESPIRATION RATE: 14 BRPM | SYSTOLIC BLOOD PRESSURE: 134 MMHG | BODY MASS INDEX: 34.93 KG/M2 | WEIGHT: 235.81 LBS | OXYGEN SATURATION: 98 %

## 2019-05-09 DIAGNOSIS — S39.012A BACK STRAIN, INITIAL ENCOUNTER: ICD-10-CM

## 2019-05-09 DIAGNOSIS — M79.18 LEFT BUTTOCK PAIN: Primary | ICD-10-CM

## 2019-05-09 PROCEDURE — 99213 OFFICE O/P EST LOW 20 MIN: CPT | Performed by: NURSE PRACTITIONER

## 2019-05-09 RX ORDER — CYCLOBENZAPRINE HCL 10 MG
10 TABLET ORAL NIGHTLY PRN
Qty: 10 TABLET | Refills: 0 | Status: SHIPPED | OUTPATIENT
Start: 2019-05-09 | End: 2019-05-23

## 2019-05-09 RX ORDER — NAPROXEN 500 MG/1
500 TABLET ORAL 2 TIMES DAILY WITH MEALS
Qty: 14 TABLET | Refills: 0 | Status: SHIPPED | OUTPATIENT
Start: 2019-05-09 | End: 2019-10-16 | Stop reason: ALTCHOICE

## 2019-05-09 NOTE — PATIENT INSTRUCTIONS
Take Naproxen, one tab twice daily until all tabs are gone. Space doses 12 hours apart for best effect. TAKE DOSES WITH FOOD. Do not take any Advil, Motrin, Aleve or ibuprofen products while taking this medication.       It is ok to take aceta

## 2019-05-09 NOTE — PROGRESS NOTES
Patient presents with:  Pain: left side glute pain, it is not shooting down the leg, when moving it hurts x 4 days      HPI:  Presents with approx 4 day history of left buttock pain which is worse with movement.  Stated he moved heavy item (basketball hoop) Normocephalic and atraumatic. Eyes: Conjunctivae are pink and moist without exudate or drainage. Cardiovascular: Normal rate, regular rhythm. No murmur. Pulmonary/Chest: No respiratory distress. Effort normal. Breath sounds clear bilaterally.  No whe file.  Patient Instructions                 Take Naproxen, one tab twice daily until all tabs are gone. Space doses 12 hours apart for best effect. TAKE DOSES WITH FOOD.  Do not take any Advil, Motrin, Aleve or ibuprofen products while taking this medicatio

## 2019-05-12 ENCOUNTER — TELEPHONE (OUTPATIENT)
Dept: INTERNAL MEDICINE CLINIC | Facility: CLINIC | Age: 34
End: 2019-05-12

## 2019-05-14 RX ORDER — NAPROXEN 500 MG/1
500 TABLET ORAL 2 TIMES DAILY WITH MEALS
Qty: 14 TABLET | Refills: 0 | OUTPATIENT
Start: 2019-05-14

## 2019-05-14 NOTE — TELEPHONE ENCOUNTER
This was short term medication only. Did patient initiate or did pharmacy. Refill not appropriate. If pain not improved should RTO. Thanks.

## 2019-05-14 NOTE — TELEPHONE ENCOUNTER
Last OV relevant to medication: 5/9/19  Last refill date: 5/9/19     #/refills: 14/0  When pt was asked to return for OV: if not better  Upcoming appt/reason: 3/23/20 gennyx

## 2019-05-15 NOTE — TELEPHONE ENCOUNTER
FYI patient returned call stated he did not request this medication he still has some left and the pain has gone from a 10 to 7 so he believe medication is working does not need refill at this time.

## 2019-10-15 ENCOUNTER — TELEPHONE (OUTPATIENT)
Dept: INTERNAL MEDICINE CLINIC | Facility: CLINIC | Age: 34
End: 2019-10-15

## 2019-10-15 DIAGNOSIS — I10 ESSENTIAL HYPERTENSION WITH GOAL BLOOD PRESSURE LESS THAN 130/80: ICD-10-CM

## 2019-10-15 NOTE — TELEPHONE ENCOUNTER
Called and spoke with patient. Patient states the sensation is confined to his skin, it is not a deeper sensation. Patient reports that he went through something similar approximately 6 years ago and was diagnosed with Shingles.  Patient states 6 years ago,

## 2019-10-15 NOTE — TELEPHONE ENCOUNTER
Was patient advised to contact pharmacy directly?:  Yes - pharmacy told him there were no refills    Is patient out of meds or supply very low?:  low    Medication and Dose Requested:  LISINOPRIL 10 MG Oral Tab  METOPROLOL SUCCINATE ER 25 MG Oral Tablet 24

## 2019-10-15 NOTE — TELEPHONE ENCOUNTER
Patient has had a burning sensation on the skin his lower back that has spread to his lower stomach. This has been going on for a week. He scheduled an appt with Dr Minna Titus for tomorrow at 4:30. Should he be seen sooner?

## 2019-10-16 ENCOUNTER — OFFICE VISIT (OUTPATIENT)
Dept: INTERNAL MEDICINE CLINIC | Facility: CLINIC | Age: 34
End: 2019-10-16
Payer: COMMERCIAL

## 2019-10-16 VITALS
HEIGHT: 69 IN | BODY MASS INDEX: 36.43 KG/M2 | DIASTOLIC BLOOD PRESSURE: 86 MMHG | HEART RATE: 100 BPM | OXYGEN SATURATION: 97 % | RESPIRATION RATE: 16 BRPM | WEIGHT: 246 LBS | SYSTOLIC BLOOD PRESSURE: 138 MMHG | TEMPERATURE: 99 F

## 2019-10-16 DIAGNOSIS — B02.9 HERPES ZOSTER WITHOUT COMPLICATION: Primary | ICD-10-CM

## 2019-10-16 DIAGNOSIS — I10 ESSENTIAL HYPERTENSION: ICD-10-CM

## 2019-10-16 PROCEDURE — 99214 OFFICE O/P EST MOD 30 MIN: CPT | Performed by: STUDENT IN AN ORGANIZED HEALTH CARE EDUCATION/TRAINING PROGRAM

## 2019-10-16 RX ORDER — LISINOPRIL 10 MG/1
10 TABLET ORAL DAILY
Qty: 90 TABLET | Refills: 1 | Status: SHIPPED | OUTPATIENT
Start: 2019-10-16 | End: 2020-04-13

## 2019-10-16 RX ORDER — VALACYCLOVIR HYDROCHLORIDE 1 G/1
1 TABLET, FILM COATED ORAL EVERY 8 HOURS
Qty: 21 TABLET | Refills: 0 | Status: SHIPPED | OUTPATIENT
Start: 2019-10-16 | End: 2019-10-23

## 2019-10-16 RX ORDER — METOPROLOL SUCCINATE 25 MG/1
25 TABLET, EXTENDED RELEASE ORAL DAILY
Qty: 90 TABLET | Refills: 1 | Status: SHIPPED | OUTPATIENT
Start: 2019-10-16 | End: 2020-04-13

## 2019-10-16 NOTE — PROGRESS NOTES
HPI:    Patient ID: Samreen Faustin is a 37year old male. Rash   This is a new problem. The current episode started in the past 7 days. The problem is unchanged. The affected locations include the abdomen.  The rash is characterized by burning, redness a Constitutional: He is oriented to person, place, and time. He appears well-developed and well-nourished. HENT:   Head: Normocephalic and atraumatic.    Right Ear: External ear normal.   Left Ear: External ear normal.   Nose: Nose normal.   Mouth/Throat: O

## 2020-04-11 DIAGNOSIS — I10 ESSENTIAL HYPERTENSION WITH GOAL BLOOD PRESSURE LESS THAN 130/80: ICD-10-CM

## 2020-04-13 RX ORDER — METOPROLOL SUCCINATE 25 MG/1
25 TABLET, EXTENDED RELEASE ORAL DAILY
Qty: 90 TABLET | Refills: 0 | Status: SHIPPED | OUTPATIENT
Start: 2020-04-13 | End: 2020-07-14

## 2020-04-13 RX ORDER — LISINOPRIL 10 MG/1
10 TABLET ORAL DAILY
Qty: 90 TABLET | Refills: 0 | Status: SHIPPED | OUTPATIENT
Start: 2020-04-13 | End: 2020-07-14

## 2020-04-13 NOTE — TELEPHONE ENCOUNTER
Last OV relevant to medication: 10/16/19  Last refill date: 10/16/19  #90/refills:1  When pt was asked to return for OV: 6 months  Upcoming appt/reason: 5/8/2020 Physical.   Lab Results   Component Value Date     (H) 03/22/2019    BUN 15 03/22/2019

## 2020-06-19 ENCOUNTER — OFFICE VISIT (OUTPATIENT)
Dept: INTERNAL MEDICINE CLINIC | Facility: CLINIC | Age: 35
End: 2020-06-19
Payer: COMMERCIAL

## 2020-06-19 VITALS
HEIGHT: 69.75 IN | BODY MASS INDEX: 35.71 KG/M2 | SYSTOLIC BLOOD PRESSURE: 130 MMHG | DIASTOLIC BLOOD PRESSURE: 72 MMHG | WEIGHT: 246.63 LBS | HEART RATE: 89 BPM | OXYGEN SATURATION: 96 % | TEMPERATURE: 98 F | RESPIRATION RATE: 14 BRPM

## 2020-06-19 DIAGNOSIS — Z13.220 SCREENING FOR LIPOID DISORDERS: ICD-10-CM

## 2020-06-19 DIAGNOSIS — Z13.29 SCREENING FOR THYROID DISORDER: ICD-10-CM

## 2020-06-19 DIAGNOSIS — Z13.1 SCREENING FOR DIABETES MELLITUS: ICD-10-CM

## 2020-06-19 DIAGNOSIS — Z00.00 ENCOUNTER FOR ANNUAL PHYSICAL EXAM: Primary | ICD-10-CM

## 2020-06-19 DIAGNOSIS — Z13.228 SCREENING FOR METABOLIC DISORDER: ICD-10-CM

## 2020-06-19 DIAGNOSIS — Z13.0 SCREENING FOR IRON DEFICIENCY ANEMIA: ICD-10-CM

## 2020-06-19 PROCEDURE — 99395 PREV VISIT EST AGE 18-39: CPT | Performed by: STUDENT IN AN ORGANIZED HEALTH CARE EDUCATION/TRAINING PROGRAM

## 2020-06-19 NOTE — PROGRESS NOTES
[facility]    REASON FOR VISIT:    Bridgett Choe is a 38year old male who presents for an [address]. Current Complaints: feeling well. Reports compliance with the medications, denies any side effects. Vaccinations: TDap 2017. Allergies    CURRENT MEDICATIONS:   Current Outpatient Medications   Medication Sig Dispense Refill   • LISINOPRIL 10 MG Oral Tab Take 1 tablet (10 mg total) by mouth daily.  90 tablet 0   • METOPROLOL SUCCINATE ER 25 MG Oral Tablet 24 Hr Take 1 tablet (25 (Temporal)   Resp 14   Ht 69.75\"   Wt 246 lb 9.6 oz (111.9 kg)   SpO2 96%   BMI 35.64 kg/m²     Wt Readings from Last 6 Encounters:  06/19/20 : 246 lb 9.6 oz (111.9 kg)  10/16/19 : 246 lb (111.6 kg)  05/09/19 : 235 lb 12.8 oz (107 kg)  03/18/19 : 240 lb 1 (primary encounter diagnosis)  Screening for lipoid disorders  Screening for thyroid disorder  Screening for iron deficiency anemia  Screening for metabolic disorder  Screening for diabetes mellitus      Age appropriate cancer screening, labs, safety, immu Administered Date(s) Administered   • FLUZONE 3 Yrs+ Quad Prsv Free 0.5 ml (31147) 10/06/2017   • TDAP 08/02/2017       Influenza Annually   Pneumococcal if high risk   Td/Tdap once then every 10 years   HPV Males 11-21   Zoster (Shingles) 60 and older:

## 2020-07-14 DIAGNOSIS — I10 ESSENTIAL HYPERTENSION WITH GOAL BLOOD PRESSURE LESS THAN 130/80: ICD-10-CM

## 2020-07-14 DIAGNOSIS — K21.9 GASTROESOPHAGEAL REFLUX DISEASE, ESOPHAGITIS PRESENCE NOT SPECIFIED: ICD-10-CM

## 2020-07-14 RX ORDER — METOPROLOL SUCCINATE 25 MG/1
25 TABLET, EXTENDED RELEASE ORAL DAILY
Qty: 90 TABLET | Refills: 0 | Status: SHIPPED | OUTPATIENT
Start: 2020-07-14 | End: 2020-10-12

## 2020-07-14 RX ORDER — LISINOPRIL 10 MG/1
10 TABLET ORAL DAILY
Qty: 90 TABLET | Refills: 0 | Status: SHIPPED | OUTPATIENT
Start: 2020-07-14 | End: 2020-09-23

## 2020-07-14 RX ORDER — PANTOPRAZOLE SODIUM 40 MG/1
TABLET, DELAYED RELEASE ORAL
Qty: 90 TABLET | Refills: 0 | Status: SHIPPED | OUTPATIENT
Start: 2020-07-14 | End: 2020-10-12

## 2020-08-06 ENCOUNTER — TELEPHONE (OUTPATIENT)
Dept: INTERNAL MEDICINE CLINIC | Facility: CLINIC | Age: 35
End: 2020-08-06

## 2020-08-06 NOTE — TELEPHONE ENCOUNTER
Virtual/Telephone Consent    Kitty Dumont verbally {consents to a Virtual/Telephone Check-In service on 8/7/20  Patient understands and accepts financial responsibility for any deductible, co-insurance and/or co-pays associated with this service.

## 2020-08-07 PROBLEM — K21.9 GERD (GASTROESOPHAGEAL REFLUX DISEASE): Status: ACTIVE | Noted: 2020-08-07

## 2020-08-07 PROBLEM — F41.9 ANXIETY: Status: ACTIVE | Noted: 2020-08-07

## 2020-08-07 NOTE — PROGRESS NOTES
Virtual Telephone Check-In    J Carlos Deleon verbally consents to a Virtual/Telephone Check-In visit on 08/07/20. Patient has been referred to the [facility] website at [email or web address] to review the yearly Consent to Treat document.     Patient Shabana English Take 1 tablet (25 mg total) by mouth daily. 90 tablet 0   • LISINOPRIL 10 MG Oral Tab Take 1 tablet (10 mg total) by mouth daily. 90 tablet 0      Past Medical History:   Diagnosis Date   • Essential hypertension    • Hypertension       Social History:   So 03/22/2019    TSHT4 0.98 06/23/2020      Lab Results   Component Value Date     03/22/2019    A1C 5.5 06/23/2020          ASSESSMENT AND PLAN:   1. Anxiety  - The patient has had anxiety for four years.  It worsened 3 weeks ago after consuming large billing was spent on reviewing labs, medications, radiology tests and decision making. Appropriate medical decision-making and tests are ordered as detailed in the plan of care above.

## 2020-08-07 NOTE — PATIENT INSTRUCTIONS
Continue a healthy diet and regular exercise    Continue anxiety management measures that you have started    Get phone numbers from the behavioral health nurse for psychiatry and a therapist so you have those resources if you need them for medication sabiha

## 2020-08-10 ENCOUNTER — TELEPHONE (OUTPATIENT)
Dept: INTERNAL MEDICINE CLINIC | Facility: CLINIC | Age: 35
End: 2020-08-10

## 2020-08-10 NOTE — TELEPHONE ENCOUNTER
Good morning Ana Maria,     On 8/10 I spoke to [facility] and he advised since seeing you on Friday he has been feeling much better and requested that I send the therapist information to you to keep on file in the event that he needed them in the future.

## 2020-09-09 ENCOUNTER — OFFICE VISIT (OUTPATIENT)
Dept: INTERNAL MEDICINE CLINIC | Facility: CLINIC | Age: 35
End: 2020-09-09
Payer: COMMERCIAL

## 2020-09-09 VITALS
HEIGHT: 69.75 IN | WEIGHT: 239 LBS | HEART RATE: 100 BPM | RESPIRATION RATE: 16 BRPM | BODY MASS INDEX: 34.6 KG/M2 | TEMPERATURE: 98 F | DIASTOLIC BLOOD PRESSURE: 84 MMHG | OXYGEN SATURATION: 98 % | SYSTOLIC BLOOD PRESSURE: 142 MMHG

## 2020-09-09 DIAGNOSIS — R68.2 DRY MOUTH: ICD-10-CM

## 2020-09-09 DIAGNOSIS — R22.0 FACIAL SWELLING: ICD-10-CM

## 2020-09-09 DIAGNOSIS — F41.9 ANXIETY: ICD-10-CM

## 2020-09-09 DIAGNOSIS — I10 ESSENTIAL HYPERTENSION: Primary | ICD-10-CM

## 2020-09-09 PROCEDURE — 99214 OFFICE O/P EST MOD 30 MIN: CPT | Performed by: NURSE PRACTITIONER

## 2020-09-09 PROCEDURE — 3077F SYST BP >= 140 MM HG: CPT | Performed by: NURSE PRACTITIONER

## 2020-09-09 PROCEDURE — 3079F DIAST BP 80-89 MM HG: CPT | Performed by: NURSE PRACTITIONER

## 2020-09-09 PROCEDURE — 3008F BODY MASS INDEX DOCD: CPT | Performed by: NURSE PRACTITIONER

## 2020-09-09 RX ORDER — SERTRALINE HYDROCHLORIDE 25 MG/1
25 TABLET, FILM COATED ORAL DAILY
Qty: 14 TABLET | Refills: 0 | Status: SHIPPED | OUTPATIENT
Start: 2020-09-09 | End: 2020-09-23

## 2020-09-09 RX ORDER — MULTIVIT WITH MINERALS/LUTEIN
1000 TABLET ORAL DAILY
COMMUNITY

## 2020-09-09 NOTE — PROGRESS NOTES
Susana  is a 38year old male. CHIEF COMPLAINT   Dry mouth, facial swelling, anxiety    HPI:   First started with a sensation of a dry mouth two weeks ago. The started to feel swollen to area under chin. The swelling is mild.  Feels pressure to megan normocephalic,ears and throat are clear  EYES:PERRLA, EOMI,conjunctiva are clear  NECK: supple, adenopathy to bilateral posterior neck noted, no thyroid masses.   LUNGS: clear to auscultation; no rhonchi, rales, or wheezing  CARDIO: RRR without murmur  GI: elevated currently. His anxiety level is high. He does report that at home it is better than in the office.  - Continue current meds. - Bring home BP cuff to office to be checked for accuracy in two weeks.      2. Anxiety  - The patient is agreeable to med

## 2020-09-09 NOTE — PATIENT INSTRUCTIONS
Continue to monitor symptoms of dry mouth and swelling to the chin and jaw area. Continue chewing gum as needed or eating something sour. Start taking Zoloft once a day and monitor for side effects.     Bring your blood pressure monitor to your next mitali

## 2020-09-10 ENCOUNTER — TELEPHONE (OUTPATIENT)
Dept: INTERNAL MEDICINE CLINIC | Facility: CLINIC | Age: 35
End: 2020-09-10

## 2020-09-10 NOTE — TELEPHONE ENCOUNTER
Ana Maria,     On 9/10/2020, the following referrals for therapy were provided to the patient:     Chelsey Hernandez, Therapist, [address] [place] [address], Therapist, [address] [place] [address], Therapist, Carlos Alberto

## 2020-09-18 ENCOUNTER — TELEPHONE (OUTPATIENT)
Dept: INTERNAL MEDICINE CLINIC | Facility: CLINIC | Age: 35
End: 2020-09-18

## 2020-09-18 NOTE — TELEPHONE ENCOUNTER
Incoming (mail or fax): fax  Received from: Dr. Leah Coronel  [id number] [facility] [address] [facility] [place], [phone], Theron Lopez, [address] [place] [address]  Phone: [phone]  Fax: [phone]  Documentation given to: Dr. Randy Freeman of OV note from 9/18/2020

## 2020-09-23 ENCOUNTER — OFFICE VISIT (OUTPATIENT)
Dept: INTERNAL MEDICINE CLINIC | Facility: CLINIC | Age: 35
End: 2020-09-23
Payer: COMMERCIAL

## 2020-09-23 VITALS
HEART RATE: 102 BPM | WEIGHT: 239.81 LBS | RESPIRATION RATE: 14 BRPM | BODY MASS INDEX: 34.72 KG/M2 | HEIGHT: 69.75 IN | SYSTOLIC BLOOD PRESSURE: 146 MMHG | OXYGEN SATURATION: 98 % | TEMPERATURE: 97 F | DIASTOLIC BLOOD PRESSURE: 94 MMHG

## 2020-09-23 DIAGNOSIS — I10 ESSENTIAL HYPERTENSION WITH GOAL BLOOD PRESSURE LESS THAN 130/80: Primary | ICD-10-CM

## 2020-09-23 DIAGNOSIS — R68.2 DRY MOUTH: ICD-10-CM

## 2020-09-23 DIAGNOSIS — F41.9 ANXIETY: ICD-10-CM

## 2020-09-23 PROCEDURE — 3008F BODY MASS INDEX DOCD: CPT | Performed by: NURSE PRACTITIONER

## 2020-09-23 PROCEDURE — 3080F DIAST BP >= 90 MM HG: CPT | Performed by: NURSE PRACTITIONER

## 2020-09-23 PROCEDURE — 3077F SYST BP >= 140 MM HG: CPT | Performed by: NURSE PRACTITIONER

## 2020-09-23 PROCEDURE — 99214 OFFICE O/P EST MOD 30 MIN: CPT | Performed by: NURSE PRACTITIONER

## 2020-09-23 RX ORDER — LANSOPRAZOLE 30 MG/1
30 CAPSULE, DELAYED RELEASE ORAL
COMMUNITY
End: 2020-10-12 | Stop reason: ALTCHOICE

## 2020-09-23 RX ORDER — LISINOPRIL 10 MG/1
20 TABLET ORAL DAILY
Qty: 90 TABLET | Refills: 0 | COMMUNITY
Start: 2020-09-23 | End: 2020-10-05 | Stop reason: DRUGHIGH

## 2020-09-23 NOTE — PATIENT INSTRUCTIONS
Take your blood pressure every day and record    Start the 20 mg of lisinopril daily. You can take two 10 mg tablets and let us know when you need a refill. Continue the metoprolol as ordered.     See psychiatry for medication management for your anxiet have high blood pressure, you should do what is listed below to lower your blood pressure. If you are taking medicines for high blood pressure, these methods may reduce or end your need for medicines in the future.   · Begin a weight-loss program if you are doses may cause your blood pressure get out of control. · If you miss a dose or doses, check with your healthcare provider or pharmacist about what to do. · Consider buying an automatic blood pressure machine. Your provider can make a recommendation.  You serious side effects. Keep all your follow up appointments. Write down medicine and blood pressure questions and bring them to your next appointment. If you have pressing concerns about your new medicine or your blood pressure, call your provider.  Unless t

## 2020-09-23 NOTE — PROGRESS NOTES
Carter Cespedes is a 38year old male. CHIEF COMPLAINT   Anxiety, blood pressure check, dry mouth    HPI:   The patient was seen on 9/9 for dry mouth, facial swelling, anxiety. His blood pressure was also noted to be slightly elevated at 142/84.   He was n last week = 126/87, 127/82, 126/78, 132/86, 134/80, 130/90, 110/67. He is taking his lisinopril and metoprolol as it is prescribed. He denies headaches or vision changes.        Current Outpatient Medications   Medication Sig Dispense Refill   • lansopraz Component Value Date    WBC 7.2 06/23/2020    RBC 5.28 06/23/2020    HGB 16.2 06/23/2020    HCT 48.1 06/23/2020    MCV 91.1 06/23/2020    MCH 30.7 06/23/2020    MCHC 33.7 06/23/2020    RDW 12.5 06/23/2020     06/23/2020      Lab Results   Componen having panic attacks 4-5 times per week. He tried Zoloft for 2 days and then stopped it because he was not feeling any relief and partly he felt better and felt like he may not need medication. He denies SI or HI.   He is able to function although his foc

## 2020-09-29 RX ORDER — SERTRALINE HYDROCHLORIDE 25 MG/1
25 TABLET, FILM COATED ORAL DAILY
Qty: 14 TABLET | Refills: 0 | OUTPATIENT
Start: 2020-09-29 | End: 2020-10-13

## 2020-10-05 RX ORDER — LISINOPRIL 20 MG/1
20 TABLET ORAL DAILY
Qty: 7 TABLET | Refills: 0 | Status: SHIPPED | OUTPATIENT
Start: 2020-10-05 | End: 2020-10-07

## 2020-10-05 NOTE — TELEPHONE ENCOUNTER
Pt has OV 9/23/20 for htn, to return 2 weeks (scheduled 10/7/20 VV). Was advised to increase his [id number] tabs to 20mg. Pt out of medication now. Has appt in 2 days.      Spoke with pt to advise of plan and to remind this is 20mg tablets so will not n

## 2020-10-05 NOTE — TELEPHONE ENCOUNTER
Did the patient contact the pharmacy directly?:  Yes - dosage increase    Is patient out of meds or supply very low?:  out    Medication Requested:  lisinopril 10 MG Oral Tab    Dose:  Increased to 20 MG    Is patient requesting a 30 or 90 day supply?:  90

## 2020-10-07 ENCOUNTER — TELEMEDICINE (OUTPATIENT)
Dept: INTERNAL MEDICINE CLINIC | Facility: CLINIC | Age: 35
End: 2020-10-07
Payer: COMMERCIAL

## 2020-10-07 VITALS — DIASTOLIC BLOOD PRESSURE: 75 MMHG | SYSTOLIC BLOOD PRESSURE: 124 MMHG

## 2020-10-07 DIAGNOSIS — I10 ESSENTIAL HYPERTENSION: Primary | ICD-10-CM

## 2020-10-07 DIAGNOSIS — F41.9 ANXIETY: ICD-10-CM

## 2020-10-07 PROCEDURE — 3078F DIAST BP <80 MM HG: CPT | Performed by: NURSE PRACTITIONER

## 2020-10-07 PROCEDURE — 3074F SYST BP LT 130 MM HG: CPT | Performed by: NURSE PRACTITIONER

## 2020-10-07 PROCEDURE — 99213 OFFICE O/P EST LOW 20 MIN: CPT | Performed by: NURSE PRACTITIONER

## 2020-10-07 RX ORDER — LISINOPRIL 20 MG/1
20 TABLET ORAL DAILY
Qty: 90 TABLET | Refills: 1 | Status: SHIPPED | OUTPATIENT
Start: 2020-10-07 | End: 2021-04-07

## 2020-10-07 NOTE — PROGRESS NOTES
Virtual video Check-In    Maureen Krishna verbally consents to a Virtual/Telephone Check-In visit on 10/07/20. Patient has been referred to the [facility] website at [email or web address] to review the yearly Consent to Treat document.     Patient understands Limited due to COVID-19  GENERAL: does not sound like they are in any acute distress on the video  LUNGS: They are able to phonate clearly without pausing due to sob, there was no coughing during the visit.   NEURO: Oriented times three        LABS: these issues and agrees to the plan. The patient is asked to return in 6 months for BP check. Please note that the following visit was completed using two-way, real-time interactive audio and/or video communication.   This has been done in good f

## 2020-10-07 NOTE — PATIENT INSTRUCTIONS
Continue your current medication. Monitor your blood pressure at least weekly and record. Follow up in 6 months for a BP check.

## 2020-10-08 DIAGNOSIS — K21.9 GASTROESOPHAGEAL REFLUX DISEASE: ICD-10-CM

## 2020-10-08 DIAGNOSIS — I10 ESSENTIAL HYPERTENSION WITH GOAL BLOOD PRESSURE LESS THAN 130/80: ICD-10-CM

## 2020-10-08 RX ORDER — LISINOPRIL 10 MG/1
10 TABLET ORAL DAILY
Qty: 90 TABLET | Refills: 0 | OUTPATIENT
Start: 2020-10-08

## 2020-10-08 NOTE — TELEPHONE ENCOUNTER
Can you check with the patient to see if he is taking the lansoprazole or the pantoprazole? Also the lisinopril 20mg was refilled yesterday so the 10mg dose was denied. Thanks.

## 2020-10-12 RX ORDER — METOPROLOL SUCCINATE 25 MG/1
25 TABLET, EXTENDED RELEASE ORAL DAILY
Qty: 90 TABLET | Refills: 0 | Status: SHIPPED | OUTPATIENT
Start: 2020-10-12 | End: 2021-01-10

## 2020-10-12 RX ORDER — PANTOPRAZOLE SODIUM 40 MG/1
TABLET, DELAYED RELEASE ORAL
Qty: 90 TABLET | Refills: 0 | Status: SHIPPED | OUTPATIENT
Start: 2020-10-12 | End: 2021-02-12

## 2020-10-12 NOTE — TELEPHONE ENCOUNTER
LMOM for patient call regarding refill request to clarify if he's taking pantoprazole or lansoprazole          Last OV relevant to medication: 10/7/2020, med check  Last refill date:7/14/2020    #/refills: 90-0  When pt was asked to return for OV: 6 months

## 2020-11-06 ENCOUNTER — MED REC SCAN ONLY (OUTPATIENT)
Dept: INTERNAL MEDICINE CLINIC | Facility: CLINIC | Age: 35
End: 2020-11-06

## 2021-01-05 ENCOUNTER — TELEPHONE (OUTPATIENT)
Dept: INTERNAL MEDICINE CLINIC | Facility: CLINIC | Age: 36
End: 2021-01-05

## 2021-01-05 ENCOUNTER — APPOINTMENT (OUTPATIENT)
Dept: CT IMAGING | Age: 36
End: 2021-01-05
Attending: PHYSICIAN ASSISTANT
Payer: COMMERCIAL

## 2021-01-05 ENCOUNTER — HOSPITAL ENCOUNTER (OUTPATIENT)
Age: 36
Discharge: HOME | End: 2021-01-05
Payer: COMMERCIAL

## 2021-01-05 VITALS
RESPIRATION RATE: 18 BRPM | HEART RATE: 98 BPM | BODY MASS INDEX: 34 KG/M2 | WEIGHT: 237 LBS | DIASTOLIC BLOOD PRESSURE: 87 MMHG | TEMPERATURE: 100 F | SYSTOLIC BLOOD PRESSURE: 138 MMHG | OXYGEN SATURATION: 98 %

## 2021-01-05 DIAGNOSIS — S16.1XXA STRAIN OF NECK MUSCLE, INITIAL ENCOUNTER: ICD-10-CM

## 2021-01-05 DIAGNOSIS — S09.90XA CLOSED HEAD INJURY WITHOUT LOSS OF CONSCIOUSNESS, INITIAL ENCOUNTER: Primary | ICD-10-CM

## 2021-01-05 DIAGNOSIS — R03.0 ELEVATED BLOOD PRESSURE READING: ICD-10-CM

## 2021-01-05 PROCEDURE — 99214 OFFICE O/P EST MOD 30 MIN: CPT

## 2021-01-05 PROCEDURE — 70450 CT HEAD/BRAIN W/O DYE: CPT | Performed by: PHYSICIAN ASSISTANT

## 2021-01-05 PROCEDURE — 72125 CT NECK SPINE W/O DYE: CPT | Performed by: PHYSICIAN ASSISTANT

## 2021-01-05 RX ORDER — NAPROXEN 500 MG/1
500 TABLET ORAL 2 TIMES DAILY PRN
Qty: 14 TABLET | Refills: 0 | Status: SHIPPED | OUTPATIENT
Start: 2021-01-05 | End: 2021-04-07 | Stop reason: ALTCHOICE

## 2021-01-05 RX ORDER — CYCLOBENZAPRINE HCL 10 MG
10 TABLET ORAL 3 TIMES DAILY PRN
Qty: 14 TABLET | Refills: 0 | Status: SHIPPED | OUTPATIENT
Start: 2021-01-05 | End: 2021-01-12

## 2021-01-05 NOTE — TELEPHONE ENCOUNTER
He should go to ER or IC because he likely will need a CT head and neck. If he refuses he can be seen in the office, but the care will be further delayed because the office takes longer to get stuff done. Thanks.

## 2021-01-05 NOTE — ED INITIAL ASSESSMENT (HPI)
PATIENT ARRIVED AMBULATORY TO ROOM. PATIENT STATES ON 12/31, HE WAS GETTING OUT OF HIS TRUCK, TRIPPED AND FELL. PATIENT STATES HE HIT THE BACK OF HIS HEAD ON THE STEP OF HIS TRUCK. NO LOC AT THE TIME. NO N/V POST INCIDENT. NO DIZZINESS, VISION CHANGES.  PAT

## 2021-01-05 NOTE — TELEPHONE ENCOUNTER
Pt called with complaint of \"little headaches\" and neck pain. 4 days ago, 1/1/2021, pt jumped out of his truck, slipped on ice and hit head on the side step of his truck. States side step somewhat springy, not completely rigid.  Was a bit \"stunned\" at

## 2021-01-05 NOTE — TELEPHONE ENCOUNTER
Spoke with pt again, advised of Luh's recommendation. Pt closer to [facility] facilities, recommended he try [facility], address given. Pt agreeable to going to . no

## 2021-01-10 DIAGNOSIS — I10 ESSENTIAL HYPERTENSION WITH GOAL BLOOD PRESSURE LESS THAN 130/80: ICD-10-CM

## 2021-01-10 RX ORDER — METOPROLOL SUCCINATE 25 MG/1
25 TABLET, EXTENDED RELEASE ORAL DAILY
Qty: 90 TABLET | Refills: 0 | Status: SHIPPED | OUTPATIENT
Start: 2021-01-10 | End: 2021-04-19

## 2021-01-21 ENCOUNTER — TELEMEDICINE (OUTPATIENT)
Dept: INTERNAL MEDICINE CLINIC | Facility: CLINIC | Age: 36
End: 2021-01-21

## 2021-01-21 DIAGNOSIS — U07.1 COVID-19: Primary | ICD-10-CM
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
## 2021-01-21 PROBLEM — Z86.16 HISTORY OF COVID-19: Status: ACTIVE | Noted: 2021-01-21

## 2021-01-21 PROCEDURE — 99213 OFFICE O/P EST LOW 20 MIN: CPT | Performed by: NURSE PRACTITIONER

## 2021-01-21 NOTE — PROGRESS NOTES
Virtual video Check-In    Maria Luisa Sutton verbally consents to a [employer] visit on 01/21/21. Patient has been referred to the [facility] website at [email or web address] to review the yearly Consent to Treat document.     Patient understands and any acute distress on the video  LUNGS: They are able to phonate clearly without pausing due to sob, there was no coughing during the visit.   NEURO: Oriented times three      LABS:      Lab Results   Component Value Date    WBC 7.2 06/23/2020    RBC 5.28 0 which includes the Dose Index Registry. FINDINGS:  CSF SPACES: No hydrocephalus, subarachnoid hemorrhage, or effacement of the basal cisterns is appreciated. There is no extra-axial fluid collection.  CEREBRUM: No acute intraparenchymal hemorrhage, edema, tissues. Bilateral palatine tonsilliths. CONCLUSION:   No acute fracture or traumatic subluxation of the cervical spine.    Remote - PS  Dictated by (CST): Pro Vasques MD on 1/05/2021 at 9:45 AM     Finalized by (CST): Pro Vasques MD on 1/0

## 2021-01-22 NOTE — PATIENT INSTRUCTIONS
Go to ER for shortness of breath  Isolate at home for ten days from onset of symptoms and 3 days of improvement of symptoms per CDC guidelines.   Infection prevention  - proper hand hygiene is important               - cough into your arm or a tissue  - Natalio You have a viral upper respiratory illness (URI), which is another term for the common cold. This illness is contagious during the first few days. It is spread through the air by coughing and sneezing.  It may also be spread by direct contact (touching the · Over-the-counter cold medicines will not shorten the length of time you’re sick, but they may be helpful for the following symptoms: cough, sore throat, and nasal and sinus congestion.  If you take prescription medicines, ask your healthcare provider or p

## 2021-02-03 ENCOUNTER — OFFICE VISIT (OUTPATIENT)
Dept: INTERNAL MEDICINE CLINIC | Facility: CLINIC | Age: 36
End: 2021-02-03
Payer: COMMERCIAL

## 2021-02-03 ENCOUNTER — LAB ENCOUNTER (OUTPATIENT)
Dept: LAB | Age: 36
End: 2021-02-03
Attending: STUDENT IN AN ORGANIZED HEALTH CARE EDUCATION/TRAINING PROGRAM
Payer: COMMERCIAL

## 2021-02-03 VITALS
SYSTOLIC BLOOD PRESSURE: 124 MMHG | HEART RATE: 111 BPM | TEMPERATURE: 98 F | HEIGHT: 69.75 IN | OXYGEN SATURATION: 98 % | BODY MASS INDEX: 35.59 KG/M2 | WEIGHT: 245.81 LBS | DIASTOLIC BLOOD PRESSURE: 82 MMHG | RESPIRATION RATE: 16 BRPM

## 2021-02-03 DIAGNOSIS — Z13.1 SCREENING FOR DIABETES MELLITUS: ICD-10-CM

## 2021-02-03 DIAGNOSIS — Z13.220 SCREENING FOR LIPOID DISORDERS: ICD-10-CM

## 2021-02-03 DIAGNOSIS — Z00.00 ENCOUNTER FOR ANNUAL PHYSICAL EXAM: Primary | ICD-10-CM

## 2021-02-03 DIAGNOSIS — Z13.228 SCREENING FOR METABOLIC DISORDER: ICD-10-CM

## 2021-02-03 DIAGNOSIS — Z13.0 SCREENING FOR IRON DEFICIENCY ANEMIA: ICD-10-CM

## 2021-02-03 DIAGNOSIS — Z13.29 SCREENING FOR THYROID DISORDER: ICD-10-CM

## 2021-02-03 LAB
ALBUMIN SERPL-MCNC: 4.2 G/DL (ref 3.4–5)
ALBUMIN/GLOB SERPL: 1.2 {RATIO} (ref 1–2)
ALP LIVER SERPL-CCNC: 90 U/L
ALT SERPL-CCNC: 43 U/L
ANION GAP SERPL CALC-SCNC: 5 MMOL/L (ref 0–18)
AST SERPL-CCNC: 25 U/L (ref 15–37)
BASOPHILS # BLD AUTO: 0.05 X10(3) UL (ref 0–0.2)
BASOPHILS NFR BLD AUTO: 0.9 %
BILIRUB SERPL-MCNC: 0.7 MG/DL (ref 0.1–2)
BUN BLD-MCNC: 13 MG/DL (ref 7–18)
BUN/CREAT SERPL: 13 (ref 10–20)
CALCIUM BLD-MCNC: 9.5 MG/DL (ref 8.5–10.1)
CHLORIDE SERPL-SCNC: 108 MMOL/L (ref 98–112)
CHOLEST SMN-MCNC: 154 MG/DL (ref ?–200)
CO2 SERPL-SCNC: 28 MMOL/L (ref 21–32)
CREAT BLD-MCNC: 1 MG/DL
DEPRECATED RDW RBC AUTO: 40.7 FL (ref 35.1–46.3)
EOSINOPHIL # BLD AUTO: 0.1 X10(3) UL (ref 0–0.7)
EOSINOPHIL NFR BLD AUTO: 1.8 %
ERYTHROCYTE [DISTWIDTH] IN BLOOD BY AUTOMATED COUNT: 11.9 % (ref 11–15)
EST. AVERAGE GLUCOSE BLD GHB EST-MCNC: 108 MG/DL (ref 68–126)
GLOBULIN PLAS-MCNC: 3.5 G/DL (ref 2.8–4.4)
GLUCOSE BLD-MCNC: 88 MG/DL (ref 70–99)
HBA1C MFR BLD HPLC: 5.4 % (ref ?–5.7)
HCT VFR BLD AUTO: 51.2 %
HDLC SERPL-MCNC: 47 MG/DL (ref 40–59)
HGB BLD-MCNC: 17.1 G/DL
IMM GRANULOCYTES # BLD AUTO: 0.02 X10(3) UL (ref 0–1)
IMM GRANULOCYTES NFR BLD: 0.4 %
LDLC SERPL CALC-MCNC: 96 MG/DL (ref ?–100)
LYMPHOCYTES # BLD AUTO: 1.65 X10(3) UL (ref 1–4)
LYMPHOCYTES NFR BLD AUTO: 29.3 %
M PROTEIN MFR SERPL ELPH: 7.7 G/DL (ref 6.4–8.2)
MCH RBC QN AUTO: 30.9 PG (ref 26–34)
MCHC RBC AUTO-ENTMCNC: 33.4 G/DL (ref 31–37)
MCV RBC AUTO: 92.6 FL
MONOCYTES # BLD AUTO: 0.52 X10(3) UL (ref 0.1–1)
MONOCYTES NFR BLD AUTO: 9.2 %
NEUTROPHILS # BLD AUTO: 3.29 X10 (3) UL (ref 1.5–7.7)
NEUTROPHILS # BLD AUTO: 3.29 X10(3) UL (ref 1.5–7.7)
NEUTROPHILS NFR BLD AUTO: 58.4 %
NONHDLC SERPL-MCNC: 107 MG/DL (ref ?–130)
OSMOLALITY SERPL CALC.SUM OF ELEC: 292 MOSM/KG (ref 275–295)
PATIENT FASTING Y/N/NP: YES
PATIENT FASTING Y/N/NP: YES
PLATELET # BLD AUTO: 244 10(3)UL (ref 150–450)
POTASSIUM SERPL-SCNC: 4.3 MMOL/L (ref 3.5–5.1)
RBC # BLD AUTO: 5.53 X10(6)UL
SODIUM SERPL-SCNC: 141 MMOL/L (ref 136–145)
TRIGL SERPL-MCNC: 57 MG/DL (ref 30–149)
TSI SER-ACNC: 0.65 MIU/ML (ref 0.36–3.74)
VLDLC SERPL CALC-MCNC: 11 MG/DL (ref 0–30)
WBC # BLD AUTO: 5.6 X10(3) UL (ref 4–11)

## 2021-02-03 PROCEDURE — 3008F BODY MASS INDEX DOCD: CPT | Performed by: STUDENT IN AN ORGANIZED HEALTH CARE EDUCATION/TRAINING PROGRAM

## 2021-02-03 PROCEDURE — 83036 HEMOGLOBIN GLYCOSYLATED A1C: CPT | Performed by: STUDENT IN AN ORGANIZED HEALTH CARE EDUCATION/TRAINING PROGRAM

## 2021-02-03 PROCEDURE — 3074F SYST BP LT 130 MM HG: CPT | Performed by: STUDENT IN AN ORGANIZED HEALTH CARE EDUCATION/TRAINING PROGRAM

## 2021-02-03 PROCEDURE — 84443 ASSAY THYROID STIM HORMONE: CPT | Performed by: STUDENT IN AN ORGANIZED HEALTH CARE EDUCATION/TRAINING PROGRAM

## 2021-02-03 PROCEDURE — 85025 COMPLETE CBC W/AUTO DIFF WBC: CPT | Performed by: STUDENT IN AN ORGANIZED HEALTH CARE EDUCATION/TRAINING PROGRAM

## 2021-02-03 PROCEDURE — 99395 PREV VISIT EST AGE 18-39: CPT | Performed by: STUDENT IN AN ORGANIZED HEALTH CARE EDUCATION/TRAINING PROGRAM

## 2021-02-03 PROCEDURE — 36415 COLL VENOUS BLD VENIPUNCTURE: CPT | Performed by: STUDENT IN AN ORGANIZED HEALTH CARE EDUCATION/TRAINING PROGRAM

## 2021-02-03 PROCEDURE — 80053 COMPREHEN METABOLIC PANEL: CPT | Performed by: STUDENT IN AN ORGANIZED HEALTH CARE EDUCATION/TRAINING PROGRAM

## 2021-02-03 PROCEDURE — 80061 LIPID PANEL: CPT | Performed by: STUDENT IN AN ORGANIZED HEALTH CARE EDUCATION/TRAINING PROGRAM

## 2021-02-03 PROCEDURE — 3079F DIAST BP 80-89 MM HG: CPT | Performed by: STUDENT IN AN ORGANIZED HEALTH CARE EDUCATION/TRAINING PROGRAM

## 2021-02-03 NOTE — PROGRESS NOTES
[address] [facility]    REASON FOR VISIT:    Bridgett Choe is a 38year old male who presents for an [address]. Current Complaints: had COVID 2-3 weeks ago. Recovered well. C/o intermittent dizziness, on and off.   Still have upper neck pressure or other  risk factors HEMOGLOBIN A1c (% of total Hgb)   Date Value   06/23/2020 5.5     HgbA1C (%)   Date Value   02/03/2021 5.4     Glucose (mg/dL)   Date Value   02/03/2021 88     GLUCOSE (mg/dL)   Date Value   06/23/2020 100 (H)         Lyle Patrick HISTORY:   Social History    Tobacco Use      Smoking status: Current Some Day Smoker        Packs/day: 0.50        Types: Cigarettes      Smokeless tobacco: Never Used    Alcohol use: Yes    Drug use: No         REVIEW OF SYSTEMS:   Constitutional: Jus Estrella erythema or edema of external auditory canal b/l. No mastoid tenderness b/l. Neck: Normal range of motion. No thyromegaly present. No cervical lymphadenopathy. There is some tenderness at the muscles of the floor of the mouth upon palpation.    Cardiovasc regards of his neck feeling of fullness: There is no thyromegaly or lymphadenopathy. It feels like he has a little bit of tenderness of the muscles of the floor of the mouth. Which can be managed conservatively with gentle massages.    Pt info handouts gi

## 2021-02-11 DIAGNOSIS — K21.9 GASTROESOPHAGEAL REFLUX DISEASE: ICD-10-CM

## 2021-02-12 RX ORDER — PANTOPRAZOLE SODIUM 40 MG/1
TABLET, DELAYED RELEASE ORAL
Qty: 90 TABLET | Refills: 0 | Status: SHIPPED | OUTPATIENT
Start: 2021-02-12 | End: 2021-05-11

## 2021-02-12 NOTE — TELEPHONE ENCOUNTER
Last OV relevant to medication: 2/3/21, PE  Last refill date: 10/12/2020     #/refills: 90-0  When pt was asked to return for OV: 6 months  Upcoming appt/reason: None scheduled  Was pt informed of any over due labs: No overdue labs

## 2021-04-07 ENCOUNTER — OFFICE VISIT (OUTPATIENT)
Dept: INTERNAL MEDICINE CLINIC | Facility: CLINIC | Age: 36
End: 2021-04-07
Payer: COMMERCIAL

## 2021-04-07 VITALS
HEIGHT: 69.75 IN | DIASTOLIC BLOOD PRESSURE: 84 MMHG | RESPIRATION RATE: 16 BRPM | SYSTOLIC BLOOD PRESSURE: 130 MMHG | HEART RATE: 106 BPM | BODY MASS INDEX: 36.22 KG/M2 | OXYGEN SATURATION: 99 % | TEMPERATURE: 97 F | WEIGHT: 250.19 LBS

## 2021-04-07 DIAGNOSIS — H10.32 ACUTE BACTERIAL CONJUNCTIVITIS OF LEFT EYE: Primary | ICD-10-CM

## 2021-04-07 PROCEDURE — 3008F BODY MASS INDEX DOCD: CPT | Performed by: NURSE PRACTITIONER

## 2021-04-07 PROCEDURE — 99213 OFFICE O/P EST LOW 20 MIN: CPT | Performed by: NURSE PRACTITIONER

## 2021-04-07 PROCEDURE — 3075F SYST BP GE 130 - 139MM HG: CPT | Performed by: NURSE PRACTITIONER

## 2021-04-07 PROCEDURE — 3079F DIAST BP 80-89 MM HG: CPT | Performed by: NURSE PRACTITIONER

## 2021-04-07 RX ORDER — LISINOPRIL 20 MG/1
20 TABLET ORAL DAILY
Qty: 90 TABLET | Refills: 0 | Status: SHIPPED | OUTPATIENT
Start: 2021-04-07 | End: 2021-05-12

## 2021-04-07 RX ORDER — POLYMYXIN B SULFATE AND TRIMETHOPRIM 1; 10000 MG/ML; [USP'U]/ML
1 SOLUTION OPHTHALMIC EVERY 4 HOURS
Qty: 1 BOTTLE | Refills: 0 | Status: SHIPPED | OUTPATIENT
Start: 2021-04-07 | End: 2021-04-14

## 2021-04-07 NOTE — PROGRESS NOTES
Gianni Lakhani is a 43year old male. CHIEF COMPLAINT   Eye pain, tearing, redness      HPI:   Today woke up and felt eye lash. He rubbed his eye and noticed it was painful. He looked at it and saw that is was pretty red.  He feels like the eyelid closed c normocephalic  EYES:PERRLA, EOMI,conjunctiva are reddened, the left worse than the right. Clear drainage. No swelling of the eyelid. No debris visible.   NECK: supple,no adenopathy  LUNGS: clear to auscultation; no rhonchi, rales, or wheezing  CARDIO: RRR w debris noted. - start polytrim for likely bacterial infection.   -If does not help see eye doctor as needed. Referral provided.    - Polymyxin B-Trimethoprim 24362-4.1 UNIT/ML-% Ophthalmic Solution; Place 1 drop into both eyes every 4 (four) hours for 7 da

## 2021-04-07 NOTE — PATIENT INSTRUCTIONS
Start the antibiotic eye drop. Monitor for effectiveness. You may also use lubricant eye drops to irrigate the eye before the antibiotic drop. If no improvement see the eye doctor.     Follow up as needed or when your physical is due

## 2021-04-17 DIAGNOSIS — I10 ESSENTIAL HYPERTENSION WITH GOAL BLOOD PRESSURE LESS THAN 130/80: ICD-10-CM

## 2021-04-19 RX ORDER — METOPROLOL SUCCINATE 25 MG/1
TABLET, EXTENDED RELEASE ORAL
Qty: 90 TABLET | Refills: 0 | Status: SHIPPED | OUTPATIENT
Start: 2021-04-19 | End: 2021-05-12

## 2021-05-11 DIAGNOSIS — K21.9 GASTROESOPHAGEAL REFLUX DISEASE: ICD-10-CM

## 2021-05-11 RX ORDER — PANTOPRAZOLE SODIUM 40 MG/1
TABLET, DELAYED RELEASE ORAL
Qty: 90 TABLET | Refills: 0 | Status: SHIPPED | OUTPATIENT
Start: 2021-05-11 | End: 2021-08-23

## 2021-05-11 NOTE — TELEPHONE ENCOUNTER
Prescription refill completed. Let the patient know that he is due for his annual physical in June and he can call and schedule an appointment for that time. Also let him know there is an outstanding order for H. pylori stool to check on possible reasons for his acid reflux. If he is going to get this lab done please remind him to stop his omeprazole 2 weeks prior to the sending in a stool sample as it can mess up the results. He can take Tums as needed in the meantime and he can also take Pepcid over-the-counter as needed for his acid reflux while he is off the omeprazole. Thank you.

## 2021-05-12 ENCOUNTER — TELEPHONE (OUTPATIENT)
Dept: INTERNAL MEDICINE CLINIC | Facility: CLINIC | Age: 36
End: 2021-05-12

## 2021-05-12 DIAGNOSIS — I10 ESSENTIAL HYPERTENSION WITH GOAL BLOOD PRESSURE LESS THAN 130/80: ICD-10-CM

## 2021-05-12 DIAGNOSIS — K21.9 GASTROESOPHAGEAL REFLUX DISEASE: ICD-10-CM

## 2021-05-14 RX ORDER — LISINOPRIL 20 MG/1
20 TABLET ORAL DAILY
Qty: 90 TABLET | Refills: 1 | Status: SHIPPED | OUTPATIENT
Start: 2021-05-14 | End: 2021-12-29

## 2021-05-14 RX ORDER — METOPROLOL SUCCINATE 25 MG/1
25 TABLET, EXTENDED RELEASE ORAL DAILY
Qty: 90 TABLET | Refills: 1 | Status: SHIPPED | OUTPATIENT
Start: 2021-05-14 | End: 2021-12-29

## 2021-05-14 RX ORDER — PANTOPRAZOLE SODIUM 40 MG/1
40 TABLET, DELAYED RELEASE ORAL
Qty: 90 TABLET | Refills: 0 | OUTPATIENT
Start: 2021-05-14

## 2021-05-19 RX ORDER — PANTOPRAZOLE SODIUM 40 MG/1
40 TABLET, DELAYED RELEASE ORAL
Qty: 7 TABLET | Refills: 0 | Status: SHIPPED | OUTPATIENT
Start: 2021-05-19 | End: 2021-05-26

## 2021-05-19 RX ORDER — METOPROLOL SUCCINATE 25 MG/1
25 TABLET, EXTENDED RELEASE ORAL DAILY
Qty: 7 TABLET | Refills: 0 | Status: SHIPPED | OUTPATIENT
Start: 2021-05-19 | End: 2021-05-26

## 2021-05-19 RX ORDER — LISINOPRIL 20 MG/1
20 TABLET ORAL DAILY
Qty: 7 TABLET | Refills: 0 | Status: SHIPPED | OUTPATIENT
Start: 2021-05-19 | End: 2021-05-26

## 2021-05-19 NOTE — TELEPHONE ENCOUNTER
Pt called and said that he did not get this rx because he had to leave to [facility] before it was sent, pt just needs enough for when he is there (about 5 days) pt is out     [facility] #[id number] - [address] AT [address] [place] [address] 151

## 2021-05-20 NOTE — TELEPHONE ENCOUNTER
Spoke to patient  Unable to  Lisinopril due to pharmacy canceled it  Notified that we will call pharmacy and address the issue    [address] to Pharmacist Johnna  Asked to override for 7 days  Pharmacist stated he is able to override w

## 2021-08-22 DIAGNOSIS — K21.9 GASTROESOPHAGEAL REFLUX DISEASE: ICD-10-CM

## 2021-08-23 RX ORDER — PANTOPRAZOLE SODIUM 40 MG/1
TABLET, DELAYED RELEASE ORAL
Qty: 90 TABLET | Refills: 0 | Status: SHIPPED | OUTPATIENT
Start: 2021-08-23 | End: 2021-11-29

## 2021-10-21 ENCOUNTER — TELEPHONE (OUTPATIENT)
Dept: INTERNAL MEDICINE CLINIC | Facility: CLINIC | Age: 36
End: 2021-10-21

## 2021-10-21 NOTE — TELEPHONE ENCOUNTER
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

## 2021-10-21 NOTE — TELEPHONE ENCOUNTER
Needs to help him high-fiber diet, good hydration, avoiding constipation are all important. Do not strain during bowel movements. Avoid sitting on them as much as possible.   If they are only external hemorrhoids Preparation H is appropriate for [facility]

## 2021-11-26 DIAGNOSIS — K21.9 GASTROESOPHAGEAL REFLUX DISEASE: ICD-10-CM

## 2021-11-29 RX ORDER — PANTOPRAZOLE SODIUM 40 MG/1
TABLET, DELAYED RELEASE ORAL
Qty: 90 TABLET | Refills: 0 | Status: SHIPPED | OUTPATIENT
Start: 2021-11-29 | End: 2022-01-03

## 2021-12-28 DIAGNOSIS — I10 ESSENTIAL HYPERTENSION WITH GOAL BLOOD PRESSURE LESS THAN 130/80: ICD-10-CM

## 2021-12-29 RX ORDER — METOPROLOL SUCCINATE 25 MG/1
25 TABLET, EXTENDED RELEASE ORAL DAILY
Qty: 30 TABLET | Refills: 0 | Status: SHIPPED | OUTPATIENT
Start: 2021-12-29 | End: 2022-01-03

## 2021-12-29 RX ORDER — LISINOPRIL 20 MG/1
20 TABLET ORAL DAILY
Qty: 30 TABLET | Refills: 0 | Status: SHIPPED | OUTPATIENT
Start: 2021-12-29 | End: 2022-01-03

## 2021-12-29 NOTE — TELEPHONE ENCOUNTER
Lisinopril  Last OV relevant to medication: 2-3-21  Last refill date: 5-14-21   #/refills: 90-1  When   pt was asked to return for OV: 6 months for HTN  Upcoming appt/reason: 1/3/22  Was pt informed of any over due labs: n/a    Metoprolol   Last OV relevan

## 2022-01-03 ENCOUNTER — OFFICE VISIT (OUTPATIENT)
Dept: INTERNAL MEDICINE CLINIC | Facility: CLINIC | Age: 37
End: 2022-01-03
Payer: COMMERCIAL

## 2022-01-03 VITALS
DIASTOLIC BLOOD PRESSURE: 80 MMHG | OXYGEN SATURATION: 97 % | BODY MASS INDEX: 35.96 KG/M2 | WEIGHT: 248.38 LBS | HEART RATE: 112 BPM | RESPIRATION RATE: 16 BRPM | SYSTOLIC BLOOD PRESSURE: 120 MMHG | TEMPERATURE: 100 F | HEIGHT: 69.75 IN

## 2022-01-03 DIAGNOSIS — F41.9 ANXIETY: ICD-10-CM

## 2022-01-03 DIAGNOSIS — K21.9 GASTROESOPHAGEAL REFLUX DISEASE, UNSPECIFIED WHETHER ESOPHAGITIS PRESENT: ICD-10-CM

## 2022-01-03 DIAGNOSIS — I10 ESSENTIAL HYPERTENSION: Primary | ICD-10-CM

## 2022-01-03 PROCEDURE — 3074F SYST BP LT 130 MM HG: CPT | Performed by: NURSE PRACTITIONER

## 2022-01-03 PROCEDURE — 99214 OFFICE O/P EST MOD 30 MIN: CPT | Performed by: NURSE PRACTITIONER

## 2022-01-03 PROCEDURE — 3008F BODY MASS INDEX DOCD: CPT | Performed by: NURSE PRACTITIONER

## 2022-01-03 PROCEDURE — 3079F DIAST BP 80-89 MM HG: CPT | Performed by: NURSE PRACTITIONER

## 2022-01-03 RX ORDER — METOPROLOL SUCCINATE 25 MG/1
25 TABLET, EXTENDED RELEASE ORAL DAILY
Qty: 90 TABLET | Refills: 0 | Status: SHIPPED | OUTPATIENT
Start: 2022-01-03

## 2022-01-03 RX ORDER — PANTOPRAZOLE SODIUM 40 MG/1
40 TABLET, DELAYED RELEASE ORAL
Qty: 90 TABLET | Refills: 0 | Status: SHIPPED | OUTPATIENT
Start: 2022-01-03

## 2022-01-03 RX ORDER — LISINOPRIL 20 MG/1
20 TABLET ORAL DAILY
Qty: 90 TABLET | Refills: 0 | Status: SHIPPED | OUTPATIENT
Start: 2022-01-03

## 2022-01-03 NOTE — PROGRESS NOTES
Evelyn Navas is a 43year old male. CHIEF COMPLAINT   Med check, anxiety    HPI:   HTN- Stable. No headaches or vision changes. No SE to medication. Taking medication as prescribed. GERD- stable with PPI. Anxiety-still uncontrolled.   At baseline is 35.9 kg/m².    GENERAL: well developed, well nourished,in no apparent distress  HEENT: atraumatic, normocephalic  LUNGS: clear to auscultation; no rhonchi, rales, or wheezing  CARDIO: RRR without murmur  GI: good BS's,no masses, organomegaly or tendernes medication  - metoprolol succinate 25 MG Oral Tablet 24 Hr; Take 1 tablet (25 mg total) by mouth daily. Dispense: 90 tablet; Refill: 0  - lisinopril 20 MG Oral Tab; Take 1 tablet (20 mg total) by mouth daily. Has appt 10/7/20  Dispense: 90 tablet;  Refill:

## 2022-01-03 NOTE — PATIENT INSTRUCTIONS
Start therapy for anxiety    Continue your current medication for blood pressure    Follow up in 3 months for your annual physical and BP check or sooner as needed      Salivary Gland Stones  Salivary glands make saliva. Saliva is mostly water.  It also has your healthcare provider or as advised. When to call your healthcare provider   Call your healthcare provider if any of the following occur:   · Pain or swelling in the gland that gets worse  · Can't open mouth or pain when opening mouth  · Fever of 100.

## 2022-01-04 ENCOUNTER — TELEPHONE (OUTPATIENT)
Dept: INTERNAL MEDICINE CLINIC | Facility: CLINIC | Age: 37
End: 2022-01-04

## 2022-01-04 NOTE — TELEPHONE ENCOUNTER
Hello,     I spoke with patient and we were able to schedule him with a [address] Provider.  Please see patients appointment information below:     Date: 1/6/22   Time: 9:00am   Duration: 60 minutes   Provider: Gael Villanueva   Office Phone: [phone]

## 2022-04-20 ENCOUNTER — HOSPITAL ENCOUNTER (OUTPATIENT)
Age: 37
Discharge: HOME | End: 2022-04-20
Payer: COMMERCIAL

## 2022-04-20 VITALS
OXYGEN SATURATION: 100 % | HEART RATE: 110 BPM | SYSTOLIC BLOOD PRESSURE: 131 MMHG | TEMPERATURE: 98 F | DIASTOLIC BLOOD PRESSURE: 94 MMHG | RESPIRATION RATE: 20 BRPM

## 2022-04-20 DIAGNOSIS — H00.014 HORDEOLUM EXTERNUM OF LEFT UPPER EYELID: Primary | ICD-10-CM

## 2022-04-20 RX ORDER — ERYTHROMYCIN 5 MG/G
1 OINTMENT OPHTHALMIC EVERY 6 HOURS
Qty: 1 G | Refills: 0 | Status: SHIPPED | OUTPATIENT
Start: 2022-04-20 | End: 2022-04-27

## 2022-04-20 NOTE — ED INITIAL ASSESSMENT (HPI)
Pt states he has swelling in his left eye with pain at 4/10 since yesterday. Visual acuity in left eye is 20/20 with swelling and tearing. 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 Denies itching.

## 2022-05-08 ENCOUNTER — TELEPHONE (OUTPATIENT)
Dept: INTERNAL MEDICINE CLINIC | Facility: CLINIC | Age: 37
End: 2022-05-08

## 2022-05-08 DIAGNOSIS — I10 ESSENTIAL HYPERTENSION: ICD-10-CM

## 2022-05-09 RX ORDER — METOPROLOL SUCCINATE 25 MG/1
TABLET, EXTENDED RELEASE ORAL
Qty: 30 TABLET | Refills: 0 | Status: SHIPPED | OUTPATIENT
Start: 2022-05-09 | End: 2022-06-02

## 2022-05-09 NOTE — TELEPHONE ENCOUNTER
Hypertension Medications Protocol Failed 05/08/2022 01:31 AM   Protocol Details  CMP or BMP in past 12 months    Last serum creatinine< 2.0    Appointment in past 6 or next 3 months     Last OV relevant to medication: 1/3/22   Last refill date: 1/3/22 90     #/refills: 0  When pt was asked to return for OV: 3 moths   Upcoming appt/reason: No future appointments.     Was pt informed of any over due labs: n/a   Lab Results   Component Value Date    GLU 88 02/03/2021    BUN 13 02/03/2021    BUNCREA 13.0 02/03/2021    CREATSERUM 1.00 02/03/2021    ANIONGAP 5 02/03/2021     01/31/2018    GFRNAA 96 02/03/2021    GFRAA 111 02/03/2021    CA 9.5 02/03/2021    OSMOCALC 292 02/03/2021    ALKPHO 90 02/03/2021    AST 25 02/03/2021    ALT 43 02/03/2021    ALKPHOS 82 04/26/2016    BILT 0.7 02/03/2021    TP 7.7 02/03/2021    ALB 4.2 02/03/2021    GLOBULIN 3.5 02/03/2021    AGRATIO 1.7 06/23/2020     02/03/2021    K 4.3 02/03/2021     02/03/2021    CO2 28.0 02/03/2021

## 2022-05-16 RX ORDER — LISINOPRIL 20 MG/1
TABLET ORAL
Qty: 30 TABLET | Refills: 0 | Status: SHIPPED | OUTPATIENT
Start: 2022-05-16

## 2022-05-16 NOTE — TELEPHONE ENCOUNTER
Hypertension Medications Protocol Failed 05/16/2022 01:30 AM   Protocol Details  CMP or BMP in past 12 months    Last serum creatinine< 2.0    Appointment in past 6 or next 3 months   lisinopril 20 MG Oral Tab  Last OV relevant to medication: 01/03/2022  Last refill date: 01/03/2022   #/refills: 90-0  When pt was asked to return for OV:The patient is asked to return in 3 months for PE and med check or sooner as needed  Upcoming appt/reason: 06/02/2022 Pe and med check   Was pt informed of any over due labs: n/a  Lab Results   Component Value Date    GLU 88 02/03/2021    BUN 13 02/03/2021    BUNCREA 13.0 02/03/2021    CREATSERUM 1.00 02/03/2021    ANIONGAP 5 02/03/2021     01/31/2018    GFRNAA 96 02/03/2021    GFRAA 111 02/03/2021    CA 9.5 02/03/2021    OSMOCALC 292 02/03/2021    ALKPHO 90 02/03/2021    AST 25 02/03/2021    ALT 43 02/03/2021    ALKPHOS 82 04/26/2016    BILT 0.7 02/03/2021    TP 7.7 02/03/2021    ALB 4.2 02/03/2021    GLOBULIN 3.5 02/03/2021    AGRATIO 1.7 06/23/2020     02/03/2021    K 4.3 02/03/2021     02/03/2021    CO2 28.0 02/03/2021

## 2022-06-02 ENCOUNTER — OFFICE VISIT (OUTPATIENT)
Dept: INTERNAL MEDICINE CLINIC | Facility: CLINIC | Age: 37
End: 2022-06-02
Payer: COMMERCIAL

## 2022-06-02 ENCOUNTER — LAB ENCOUNTER (OUTPATIENT)
Dept: LAB | Age: 37
End: 2022-06-02
Attending: INTERNAL MEDICINE
Payer: COMMERCIAL

## 2022-06-02 VITALS
HEART RATE: 97 BPM | DIASTOLIC BLOOD PRESSURE: 80 MMHG | HEIGHT: 69.5 IN | TEMPERATURE: 99 F | RESPIRATION RATE: 16 BRPM | WEIGHT: 246.13 LBS | OXYGEN SATURATION: 96 % | BODY MASS INDEX: 35.63 KG/M2 | SYSTOLIC BLOOD PRESSURE: 130 MMHG

## 2022-06-02 DIAGNOSIS — Z13.29 SCREENING FOR THYROID DISORDER: ICD-10-CM

## 2022-06-02 DIAGNOSIS — Z13.220 SCREENING FOR CHOLESTEROL LEVEL: ICD-10-CM

## 2022-06-02 DIAGNOSIS — I10 ESSENTIAL HYPERTENSION: ICD-10-CM

## 2022-06-02 DIAGNOSIS — F41.9 ANXIETY: ICD-10-CM

## 2022-06-02 DIAGNOSIS — Z72.0 TOBACCO USE: ICD-10-CM

## 2022-06-02 DIAGNOSIS — Z00.00 ENCOUNTER FOR ANNUAL PHYSICAL EXAM: ICD-10-CM

## 2022-06-02 DIAGNOSIS — Z00.00 ENCOUNTER FOR ANNUAL PHYSICAL EXAM: Primary | ICD-10-CM

## 2022-06-02 DIAGNOSIS — K21.9 GASTROESOPHAGEAL REFLUX DISEASE, UNSPECIFIED WHETHER ESOPHAGITIS PRESENT: ICD-10-CM

## 2022-06-02 LAB
ALBUMIN SERPL-MCNC: 4.2 G/DL (ref 3.4–5)
ALBUMIN/GLOB SERPL: 1.3 {RATIO} (ref 1–2)
ALP LIVER SERPL-CCNC: 101 U/L
ALT SERPL-CCNC: 34 U/L
ANION GAP SERPL CALC-SCNC: 6 MMOL/L (ref 0–18)
AST SERPL-CCNC: 19 U/L (ref 15–37)
BASOPHILS # BLD AUTO: 0.05 X10(3) UL (ref 0–0.2)
BASOPHILS NFR BLD AUTO: 0.9 %
BILIRUB SERPL-MCNC: 0.8 MG/DL (ref 0.1–2)
BUN BLD-MCNC: 16 MG/DL (ref 7–18)
CALCIUM BLD-MCNC: 9.1 MG/DL (ref 8.5–10.1)
CHLORIDE SERPL-SCNC: 106 MMOL/L (ref 98–112)
CHOLEST SERPL-MCNC: 182 MG/DL (ref ?–200)
CO2 SERPL-SCNC: 26 MMOL/L (ref 21–32)
CREAT BLD-MCNC: 0.88 MG/DL
EOSINOPHIL # BLD AUTO: 0.08 X10(3) UL (ref 0–0.7)
EOSINOPHIL NFR BLD AUTO: 1.4 %
ERYTHROCYTE [DISTWIDTH] IN BLOOD BY AUTOMATED COUNT: 12.1 %
FASTING PATIENT LIPID ANSWER: YES
FASTING STATUS PATIENT QL REPORTED: YES
GLOBULIN PLAS-MCNC: 3.3 G/DL (ref 2.8–4.4)
GLUCOSE BLD-MCNC: 114 MG/DL (ref 70–99)
HCT VFR BLD AUTO: 49.4 %
HDLC SERPL-MCNC: 45 MG/DL (ref 40–59)
HGB BLD-MCNC: 16.8 G/DL
IMM GRANULOCYTES # BLD AUTO: 0.03 X10(3) UL (ref 0–1)
IMM GRANULOCYTES NFR BLD: 0.5 %
LDLC SERPL CALC-MCNC: 125 MG/DL (ref ?–100)
LYMPHOCYTES # BLD AUTO: 1.45 X10(3) UL (ref 1–4)
LYMPHOCYTES NFR BLD AUTO: 24.9 %
MCH RBC QN AUTO: 31.3 PG (ref 26–34)
MCHC RBC AUTO-ENTMCNC: 34 G/DL (ref 31–37)
MCV RBC AUTO: 92 FL
MONOCYTES # BLD AUTO: 0.56 X10(3) UL (ref 0.1–1)
MONOCYTES NFR BLD AUTO: 9.6 %
NEUTROPHILS # BLD AUTO: 3.65 X10 (3) UL (ref 1.5–7.7)
NEUTROPHILS # BLD AUTO: 3.65 X10(3) UL (ref 1.5–7.7)
NEUTROPHILS NFR BLD AUTO: 62.7 %
NONHDLC SERPL-MCNC: 137 MG/DL (ref ?–130)
OSMOLALITY SERPL CALC.SUM OF ELEC: 288 MOSM/KG (ref 275–295)
PLATELET # BLD AUTO: 228 10(3)UL (ref 150–450)
POTASSIUM SERPL-SCNC: 4.1 MMOL/L (ref 3.5–5.1)
PROT SERPL-MCNC: 7.5 G/DL (ref 6.4–8.2)
RBC # BLD AUTO: 5.37 X10(6)UL
SODIUM SERPL-SCNC: 138 MMOL/L (ref 136–145)
TRIGL SERPL-MCNC: 62 MG/DL (ref 30–149)
TSI SER-ACNC: 0.79 MIU/ML (ref 0.36–3.74)
VLDLC SERPL CALC-MCNC: 11 MG/DL (ref 0–30)
WBC # BLD AUTO: 5.8 X10(3) UL (ref 4–11)

## 2022-06-02 PROCEDURE — 99214 OFFICE O/P EST MOD 30 MIN: CPT | Performed by: NURSE PRACTITIONER

## 2022-06-02 PROCEDURE — 99395 PREV VISIT EST AGE 18-39: CPT | Performed by: NURSE PRACTITIONER

## 2022-06-02 PROCEDURE — 3008F BODY MASS INDEX DOCD: CPT | Performed by: NURSE PRACTITIONER

## 2022-06-02 PROCEDURE — 3075F SYST BP GE 130 - 139MM HG: CPT | Performed by: NURSE PRACTITIONER

## 2022-06-02 PROCEDURE — 36415 COLL VENOUS BLD VENIPUNCTURE: CPT

## 2022-06-02 PROCEDURE — 85025 COMPLETE CBC W/AUTO DIFF WBC: CPT

## 2022-06-02 PROCEDURE — 80053 COMPREHEN METABOLIC PANEL: CPT

## 2022-06-02 PROCEDURE — 3079F DIAST BP 80-89 MM HG: CPT | Performed by: NURSE PRACTITIONER

## 2022-06-02 PROCEDURE — 80061 LIPID PANEL: CPT

## 2022-06-02 PROCEDURE — 84443 ASSAY THYROID STIM HORMONE: CPT

## 2022-06-02 RX ORDER — METOPROLOL SUCCINATE 25 MG/1
25 TABLET, EXTENDED RELEASE ORAL DAILY
Qty: 90 TABLET | Refills: 3 | Status: SHIPPED | OUTPATIENT
Start: 2022-06-02

## 2022-06-02 RX ORDER — LISINOPRIL 20 MG/1
20 TABLET ORAL DAILY
Qty: 90 TABLET | Refills: 3 | Status: SHIPPED | OUTPATIENT
Start: 2022-06-02

## 2022-06-22 DIAGNOSIS — K21.9 GASTROESOPHAGEAL REFLUX DISEASE, UNSPECIFIED WHETHER ESOPHAGITIS PRESENT: ICD-10-CM

## 2022-06-22 RX ORDER — PANTOPRAZOLE SODIUM 40 MG/1
40 TABLET, DELAYED RELEASE ORAL
Qty: 90 TABLET | Refills: 3 | Status: SHIPPED | OUTPATIENT
Start: 2022-06-22

## 2022-06-22 NOTE — TELEPHONE ENCOUNTER
Last OV relevant to medication: 06/02/2022   Last refill date: 1/3/2022   #/refills: 90-0  When pt was asked to return for OV: The patient is asked to return in 1 year for a complete physical and med check or sooner as needed  Upcoming appt/reason: n/a  Was pt informed of any over due labs: n/a

## 2022-09-23 ENCOUNTER — HOSPITAL ENCOUNTER (OUTPATIENT)
Age: 37
Discharge: HOME | End: 2022-09-23

## 2022-09-23 VITALS
DIASTOLIC BLOOD PRESSURE: 95 MMHG | RESPIRATION RATE: 22 BRPM | SYSTOLIC BLOOD PRESSURE: 144 MMHG | TEMPERATURE: 98 F | OXYGEN SATURATION: 98 % | HEART RATE: 88 BPM

## 2022-09-23 DIAGNOSIS — S30.861A INSECT BITE OF ABDOMINAL WALL, INITIAL ENCOUNTER: Primary | ICD-10-CM
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
## 2022-09-23 DIAGNOSIS — L03.311 CELLULITIS OF ABDOMINAL WALL: ICD-10-CM

## 2022-09-23 DIAGNOSIS — R03.0 ELEVATED BLOOD PRESSURE READING: ICD-10-CM

## 2022-09-23 PROCEDURE — 99213 OFFICE O/P EST LOW 20 MIN: CPT | Performed by: NURSE PRACTITIONER

## 2022-09-23 RX ORDER — CEPHALEXIN 500 MG/1
500 CAPSULE ORAL 2 TIMES DAILY
Qty: 20 CAPSULE | Refills: 0 | Status: SHIPPED | OUTPATIENT
Start: 2022-09-23 | End: 2022-10-03

## 2022-09-23 NOTE — ED INITIAL ASSESSMENT (HPI)
Pt c/o bee sting to R abd x3 days. States positive itching, redness, inc warmth to touch, clear drainage. No throat tightness. No lip swelling.   States has not had a reaction like to to bee stings in past.

## 2023-06-07 DIAGNOSIS — Z00.00 ENCOUNTER FOR ANNUAL PHYSICAL EXAM: Primary | ICD-10-CM

## 2023-06-07 DIAGNOSIS — I10 ESSENTIAL HYPERTENSION: ICD-10-CM

## 2023-06-07 RX ORDER — METOPROLOL SUCCINATE 25 MG/1
TABLET, EXTENDED RELEASE ORAL
Qty: 30 TABLET | Refills: 0 | Status: SHIPPED | OUTPATIENT
Start: 2023-06-07

## 2023-06-07 NOTE — TELEPHONE ENCOUNTER
Last OV relevant to medication: 6/2/22  Last refill date: 6/2/22     #/refills: 90/3  When pt was asked to return for OV:1 yr  Upcoming appt/reason:PE-none. Will send My chart reminder. Was pt informed of any over due labs:none    Do you want labs ordered?    Lab Results   Component Value Date     (H) 06/02/2022    BUN 16 06/02/2022    BUNCREA 13.0 02/03/2021    CREATSERUM 0.88 06/02/2022    ANIONGAP 6 06/02/2022     01/31/2018    GFRNAA 108 06/02/2022    GFRAA 125 06/02/2022    CA 9.1 06/02/2022    OSMOCALC 288 06/02/2022    ALKPHO 101 06/02/2022    AST 19 06/02/2022    ALT 34 06/02/2022    ALKPHOS 82 04/26/2016    BILT 0.8 06/02/2022    TP 7.5 06/02/2022    ALB 4.2 06/02/2022    GLOBULIN 3.3 06/02/2022    AGRATIO 1.7 06/23/2020     06/02/2022    K 4.1 06/02/2022     06/02/2022    CO2 26.0 06/02/2022

## 2023-07-06 DIAGNOSIS — K21.9 GASTROESOPHAGEAL REFLUX DISEASE, UNSPECIFIED WHETHER ESOPHAGITIS PRESENT: ICD-10-CM

## 2023-07-10 RX ORDER — PANTOPRAZOLE SODIUM 40 MG/1
40 TABLET, DELAYED RELEASE ORAL
Qty: 30 TABLET | Refills: 0 | Status: SHIPPED | OUTPATIENT
Start: 2023-07-10

## 2023-07-10 NOTE — TELEPHONE ENCOUNTER
Patient scheduled a PE and med check on August 2nd with [facility]

## 2023-07-10 NOTE — TELEPHONE ENCOUNTER
Last OV relevant to medication: 6/2/22  Last refill date: 6/22/22 #90/refills: 3  When pt was asked to return for OV: 6/2/23  Upcoming appt/reason: No future appointments. Was pt informed of any over due labs: pt informed and read message, message sent again. [facility] please call to schedule PE due in June, thanks!

## 2023-07-11 DIAGNOSIS — I10 ESSENTIAL HYPERTENSION: ICD-10-CM

## 2023-07-12 RX ORDER — METOPROLOL SUCCINATE 25 MG/1
25 TABLET, EXTENDED RELEASE ORAL DAILY
Qty: 30 TABLET | Refills: 0 | Status: SHIPPED | OUTPATIENT
Start: 2023-07-12

## 2023-07-12 NOTE — TELEPHONE ENCOUNTER
Last OV relevant to medication: 6/2/22  Last refill date: 6/7/23 #30/refills: 0  When pt was asked to return for OV: 6/2/23  Upcoming appt/reason:   Future Appointments   Date Time Provider Jay Mckenzie   8/2/2023  8:00 AM Ana Maria Martínez APRN [facility] [address]   Was pt informed of any over due labs: pt informed to complete labs  Lab Results   Component Value Date     (H) 06/02/2022    BUN 16 06/02/2022    BUNCREA 13.0 02/03/2021    CREATSERUM 0.88 06/02/2022    ANIONGAP 6 06/02/2022     01/31/2018    GFRNAA 108 06/02/2022    GFRAA 125 06/02/2022    CA 9.1 06/02/2022    OSMOCALC 288 06/02/2022    ALKPHO 101 06/02/2022    AST 19 06/02/2022    ALT 34 06/02/2022    ALKPHOS 82 04/26/2016    BILT 0.8 06/02/2022    TP 7.5 06/02/2022    ALB 4.2 06/02/2022    GLOBULIN 3.3 06/02/2022    AGRATIO 1.7 06/23/2020     06/02/2022    K 4.1 06/02/2022     06/02/2022    CO2 26.0 06/02/2022

## 2023-07-26 ENCOUNTER — LAB ENCOUNTER (OUTPATIENT)
Dept: LAB | Facility: HOSPITAL | Age: 38
End: 2023-07-26
Attending: NURSE PRACTITIONER
Payer: COMMERCIAL

## 2023-07-26 DIAGNOSIS — Z00.00 ENCOUNTER FOR ANNUAL PHYSICAL EXAM: ICD-10-CM

## 2023-07-26 DIAGNOSIS — R73.9 ELEVATED BLOOD SUGAR: ICD-10-CM

## 2023-07-26 LAB
ALBUMIN SERPL-MCNC: 3.8 G/DL (ref 3.4–5)
ALBUMIN/GLOB SERPL: 1 {RATIO} (ref 1–2)
ALP LIVER SERPL-CCNC: 97 U/L
ALT SERPL-CCNC: 29 U/L
ANION GAP SERPL CALC-SCNC: 7 MMOL/L (ref 0–18)
AST SERPL-CCNC: 14 U/L (ref 15–37)
BASOPHILS # BLD AUTO: 0.08 X10(3) UL (ref 0–0.2)
BASOPHILS NFR BLD AUTO: 1.3 %
BILIRUB SERPL-MCNC: 0.5 MG/DL (ref 0.1–2)
BUN BLD-MCNC: 17 MG/DL (ref 7–18)
BUN/CREAT SERPL: 17.9 (ref 10–20)
CALCIUM BLD-MCNC: 9.1 MG/DL (ref 8.5–10.1)
CHLORIDE SERPL-SCNC: 108 MMOL/L (ref 98–112)
CHOLEST SERPL-MCNC: 154 MG/DL (ref ?–200)
CO2 SERPL-SCNC: 26 MMOL/L (ref 21–32)
CREAT BLD-MCNC: 0.95 MG/DL
DEPRECATED RDW RBC AUTO: 38.9 FL (ref 35.1–46.3)
EGFRCR SERPLBLD CKD-EPI 2021: 104 ML/MIN/1.73M2 (ref 60–?)
EOSINOPHIL # BLD AUTO: 0.14 X10(3) UL (ref 0–0.7)
EOSINOPHIL NFR BLD AUTO: 2.2 %
ERYTHROCYTE [DISTWIDTH] IN BLOOD BY AUTOMATED COUNT: 11.7 % (ref 11–15)
EST. AVERAGE GLUCOSE BLD GHB EST-MCNC: 108 MG/DL (ref 68–126)
FASTING PATIENT LIPID ANSWER: YES
FASTING STATUS PATIENT QL REPORTED: YES
GLOBULIN PLAS-MCNC: 3.7 G/DL (ref 2.8–4.4)
GLUCOSE BLD-MCNC: 107 MG/DL (ref 70–99)
HBA1C MFR BLD: 5.4 % (ref ?–5.7)
HCT VFR BLD AUTO: 47.3 %
HDLC SERPL-MCNC: 40 MG/DL (ref 40–59)
HGB BLD-MCNC: 16 G/DL
IMM GRANULOCYTES # BLD AUTO: 0.02 X10(3) UL (ref 0–1)
IMM GRANULOCYTES NFR BLD: 0.3 %
LDLC SERPL CALC-MCNC: 101 MG/DL (ref ?–100)
LYMPHOCYTES # BLD AUTO: 1.84 X10(3) UL (ref 1–4)
LYMPHOCYTES NFR BLD AUTO: 28.8 %
MCH RBC QN AUTO: 30.8 PG (ref 26–34)
MCHC RBC AUTO-ENTMCNC: 33.8 G/DL (ref 31–37)
MCV RBC AUTO: 91 FL
MONOCYTES # BLD AUTO: 0.59 X10(3) UL (ref 0.1–1)
MONOCYTES NFR BLD AUTO: 9.2 %
NEUTROPHILS # BLD AUTO: 3.71 X10 (3) UL (ref 1.5–7.7)
NEUTROPHILS # BLD AUTO: 3.71 X10(3) UL (ref 1.5–7.7)
NEUTROPHILS NFR BLD AUTO: 58.2 %
NONHDLC SERPL-MCNC: 114 MG/DL (ref ?–130)
OSMOLALITY SERPL CALC.SUM OF ELEC: 294 MOSM/KG (ref 275–295)
PLATELET # BLD AUTO: 247 10(3)UL (ref 150–450)
POTASSIUM SERPL-SCNC: 3.7 MMOL/L (ref 3.5–5.1)
PROT SERPL-MCNC: 7.5 G/DL (ref 6.4–8.2)
RBC # BLD AUTO: 5.2 X10(6)UL
SODIUM SERPL-SCNC: 141 MMOL/L (ref 136–145)
TRIGL SERPL-MCNC: 67 MG/DL (ref 30–149)
TSI SER-ACNC: 0.96 MIU/ML (ref 0.36–3.74)
VLDLC SERPL CALC-MCNC: 11 MG/DL (ref 0–30)
WBC # BLD AUTO: 6.4 X10(3) UL (ref 4–11)

## 2023-07-26 PROCEDURE — 84443 ASSAY THYROID STIM HORMONE: CPT

## 2023-07-26 PROCEDURE — 85025 COMPLETE CBC W/AUTO DIFF WBC: CPT

## 2023-07-26 PROCEDURE — 80053 COMPREHEN METABOLIC PANEL: CPT

## 2023-07-26 PROCEDURE — 83036 HEMOGLOBIN GLYCOSYLATED A1C: CPT

## 2023-07-26 PROCEDURE — 80061 LIPID PANEL: CPT

## 2023-07-26 PROCEDURE — 36415 COLL VENOUS BLD VENIPUNCTURE: CPT

## 2023-08-02 ENCOUNTER — OFFICE VISIT (OUTPATIENT)
Dept: INTERNAL MEDICINE CLINIC | Facility: CLINIC | Age: 38
End: 2023-08-02
Payer: COMMERCIAL

## 2023-08-02 VITALS
WEIGHT: 246 LBS | TEMPERATURE: 98 F | RESPIRATION RATE: 20 BRPM | SYSTOLIC BLOOD PRESSURE: 132 MMHG | HEIGHT: 69 IN | DIASTOLIC BLOOD PRESSURE: 80 MMHG | OXYGEN SATURATION: 96 % | HEART RATE: 87 BPM | BODY MASS INDEX: 36.43 KG/M2

## 2023-08-02 DIAGNOSIS — Z13.220 SCREENING FOR CHOLESTEROL LEVEL: ICD-10-CM

## 2023-08-02 DIAGNOSIS — F41.9 ANXIETY: ICD-10-CM

## 2023-08-02 DIAGNOSIS — Z00.00 ENCOUNTER FOR ANNUAL PHYSICAL EXAM: Primary | ICD-10-CM

## 2023-08-02 DIAGNOSIS — K21.9 GASTROESOPHAGEAL REFLUX DISEASE, UNSPECIFIED WHETHER ESOPHAGITIS PRESENT: ICD-10-CM

## 2023-08-02 DIAGNOSIS — Z72.0 TOBACCO USE: ICD-10-CM

## 2023-08-02 DIAGNOSIS — Z13.29 SCREENING FOR THYROID DISORDER: ICD-10-CM

## 2023-08-02 DIAGNOSIS — I10 ESSENTIAL HYPERTENSION: ICD-10-CM

## 2023-08-02 PROBLEM — M25.519 SHOULDER JOINT PAIN: Status: ACTIVE | Noted: 2023-08-02

## 2023-08-02 PROBLEM — M75.42 IMPINGEMENT SYNDROME OF LEFT SHOULDER REGION: Status: ACTIVE | Noted: 2023-08-02

## 2023-08-02 PROBLEM — M25.512 LEFT SHOULDER PAIN: Status: ACTIVE | Noted: 2023-08-02

## 2023-08-02 PROCEDURE — 3075F SYST BP GE 130 - 139MM HG: CPT | Performed by: NURSE PRACTITIONER

## 2023-08-02 PROCEDURE — 99214 OFFICE O/P EST MOD 30 MIN: CPT | Performed by: NURSE PRACTITIONER

## 2023-08-02 PROCEDURE — 3079F DIAST BP 80-89 MM HG: CPT | Performed by: NURSE PRACTITIONER

## 2023-08-02 PROCEDURE — 3008F BODY MASS INDEX DOCD: CPT | Performed by: NURSE PRACTITIONER

## 2023-08-02 PROCEDURE — 99396 PREV VISIT EST AGE 40-64: CPT | Performed by: NURSE PRACTITIONER

## 2023-08-02 RX ORDER — LISINOPRIL 20 MG/1
20 TABLET ORAL DAILY
Qty: 90 TABLET | Refills: 3 | Status: SHIPPED | OUTPATIENT
Start: 2023-08-02

## 2023-08-02 RX ORDER — METOPROLOL SUCCINATE 25 MG/1
25 TABLET, EXTENDED RELEASE ORAL DAILY
Qty: 90 TABLET | Refills: 3 | Status: SHIPPED | OUTPATIENT
Start: 2023-08-02

## 2023-08-02 RX ORDER — PANTOPRAZOLE SODIUM 40 MG/1
40 TABLET, DELAYED RELEASE ORAL DAILY PRN
Qty: 90 TABLET | Refills: 0 | Status: SHIPPED | OUTPATIENT
Start: 2023-08-02

## 2023-11-09 DIAGNOSIS — K21.9 GASTROESOPHAGEAL REFLUX DISEASE, UNSPECIFIED WHETHER ESOPHAGITIS PRESENT: ICD-10-CM

## 2023-11-13 RX ORDER — PANTOPRAZOLE SODIUM 40 MG/1
40 TABLET, DELAYED RELEASE ORAL
Qty: 90 TABLET | Refills: 2 | Status: SHIPPED | OUTPATIENT
Start: 2023-11-13

## 2023-11-13 NOTE — TELEPHONE ENCOUNTER
Last OV relevant to medication: 8/2/23  Last refill date: 8/2/23 #90/refills: 0  When pt was asked to return for OV: 8/2/24  Upcoming appt/reason: No future appointments.   Was pt informed of any over due labs: due July 2024

## 2024-01-23 ENCOUNTER — TELEPHONE (OUTPATIENT)
Dept: INTERNAL MEDICINE CLINIC | Facility: CLINIC | Age: 39
End: 2024-01-23

## 2024-01-23 NOTE — TELEPHONE ENCOUNTER
Called this pharmacy and they do not have pharmacy in this store. Called another [facility] in [facility] [phone]. Called in 5 pills for each medication . Patient notified. Patient verbalized understanding

## 2024-01-23 NOTE — TELEPHONE ENCOUNTER
Pt is out of the area. He needs to get a few emergency pills. He is stuck in [place], [place] due to the ice.      pantoprazole 40 MG Oral   lisinopril 20 MG Oral Tab   metoprolol succinate ER 25     Please call in to:  [facility]    [phone]

## 2024-02-20 ENCOUNTER — TELEPHONE (OUTPATIENT)
Dept: INTERNAL MEDICINE CLINIC | Facility: CLINIC | Age: 39
End: 2024-02-20

## 2024-02-20 NOTE — TELEPHONE ENCOUNTER
Incoming (mail or fax):  fax  Received from:  [facility]  Documentation given to:  Triage Results    CTA of neck results

## 2024-02-21 ENCOUNTER — TELEPHONE (OUTPATIENT)
Dept: INTERNAL MEDICINE CLINIC | Facility: CLINIC | Age: 39
End: 2024-02-21

## 2024-02-21 NOTE — TELEPHONE ENCOUNTER
Incoming (mail or fax):  Fax  Received from:  [facility]  Documentation given to:  Triage

## 2024-02-21 NOTE — TELEPHONE ENCOUNTER
CT of the neck ordered by Luh, shows no acute process in the neck. In Luh's bin for review, thanks!

## 2024-08-20 DIAGNOSIS — I10 ESSENTIAL HYPERTENSION: ICD-10-CM

## 2024-08-23 DIAGNOSIS — I10 ESSENTIAL HYPERTENSION: ICD-10-CM

## 2024-08-23 RX ORDER — LISINOPRIL 20 MG/1
20 TABLET ORAL DAILY
Qty: 30 TABLET | Refills: 0 | Status: SHIPPED | OUTPATIENT
Start: 2024-08-23

## 2024-08-23 NOTE — TELEPHONE ENCOUNTER
Last OV relevant to medication: 2/12/24  Last refill date: 8/2/23 #90/refills: 3  When pt was asked to return for OV: 3/12/24  Upcoming appt/reason: No future appointments.  Was pt informed of any over due labs: utd  Lab Results   Component Value Date     (H) 07/26/2023    BUN 17 07/26/2023    BUNCREA 17.9 07/26/2023    CREATSERUM 0.95 07/26/2023    ANIONGAP 7 07/26/2023     01/31/2018    GFRNAA 108 06/02/2022    GFRAA 125 06/02/2022    CA 9.1 07/26/2023    OSMOCALC 294 07/26/2023    ALKPHO 97 07/26/2023    AST 14 (L) 07/26/2023    ALT 29 07/26/2023    ALKPHOS 82 04/26/2016    BILT 0.5 07/26/2023    TP 7.5 07/26/2023    ALB 3.8 07/26/2023    GLOBULIN 3.7 07/26/2023    AGRATIO 1.7 06/23/2020     07/26/2023    K 3.7 07/26/2023     07/26/2023    CO2 26.0 07/26/2023     Please call to schedule annual physical exam and follow up thank you!!

## 2024-08-26 RX ORDER — METOPROLOL SUCCINATE 25 MG/1
25 TABLET, EXTENDED RELEASE ORAL DAILY
Qty: 30 TABLET | Refills: 0 | Status: SHIPPED | OUTPATIENT
Start: 2024-08-26 | End: 2024-08-26

## 2024-08-26 RX ORDER — METOPROLOL SUCCINATE 25 MG/1
25 TABLET, EXTENDED RELEASE ORAL DAILY
Qty: 30 TABLET | Refills: 0 | Status: SHIPPED | OUTPATIENT
Start: 2024-08-26

## 2024-08-26 NOTE — TELEPHONE ENCOUNTER
Last OV relevant to medication: 8/2/23  Last refill date: 8/2/23 #90/refills: 3  When pt was asked to return for OV: 8/2/24 (was asked to return sooner for acute issue)  Upcoming appt/reason: No future appointment.  Was pt informed of any over due labs: utd  Lab Results   Component Value Date     (H) 07/26/2023    BUN 17 07/26/2023    BUNCREA 17.9 07/26/2023    CREATSERUM 0.95 07/26/2023    ANIONGAP 7 07/26/2023     01/31/2018    GFRNAA 108 06/02/2022    GFRAA 125 06/02/2022    CA 9.1 07/26/2023    OSMOCALC 294 07/26/2023    ALKPHO 97 07/26/2023    AST 14 (L) 07/26/2023    ALT 29 07/26/2023    ALKPHOS 82 04/26/2016    BILT 0.5 07/26/2023    TP 7.5 07/26/2023    ALB 3.8 07/26/2023    GLOBULIN 3.7 07/26/2023    AGRATIO 1.7 06/23/2020     07/26/2023    K 3.7 07/26/2023     07/26/2023    CO2 26.0 07/26/2023     Please call to schedule annual physical exam, thanks!

## 2024-08-26 NOTE — TELEPHONE ENCOUNTER
Patient called to request 1 pill be sent to the [facility] #[id number] - [place] [id number] [facility], [phone], [phone] [[id number]] .  He has been out of medication for two days and won't be near his regular pharmacy in [place] until tomorrow, which is where the 30 day supply was sent.  Please call patient once the pill is sent.    Also, he scheduled a physical/med check with [facility] on 9/24/24    Thank you!

## 2024-08-26 NOTE — ED PROVIDER NOTES
Patient Seen in: [facility]    History   Patient presents with:  Fall    Stated Complaint: fell and hit head, pain on the side of neck, pain comes and goes    HPI      55-year-old male presents with chief complaint of head injury.   Onset 12/3 Spoke with pt daughter regarding oxygen company. Daughter states Mary.    Smoking status: Current Some Day Smoker        Packs/day: 0.50        Types: Cigarettes      Smokeless tobacco: Never Used    Alcohol use: Yes    Drug use: No      Review of Systems    Positive for stated complaint: fell and hit head, pain on the side o peritoneal signs. Genitourinary: Not examined. Lymphatic: No gross lymphadenopathy noted. Musculoskeletal: Musculoskeletal system is grossly intact. There is no obvious deformity. Neurological: Cranial nerve II through XII intact.  DTRs intact and symme pressure reading    Disposition:  Discharge    Follow-up:  Jacinto Cai MD  [address] [address] [phone]    Schedule an appointment as soon as possible for a visit in 2 days  For follow-up      Medications Prescr

## 2024-08-26 NOTE — TELEPHONE ENCOUNTER
Spoke to pt, asked if he wants to  30 day at his local in [place] instead, rx sent to [place] [facility]. Cancelled with [facility]. [place] [facility] confirmed once this is cancelled with [facility] they can fill at [facility].

## 2024-08-28 DIAGNOSIS — I10 ESSENTIAL HYPERTENSION: ICD-10-CM

## 2024-08-29 RX ORDER — METOPROLOL SUCCINATE 25 MG/1
25 TABLET, EXTENDED RELEASE ORAL DAILY
Qty: 30 TABLET | Refills: 0 | OUTPATIENT
Start: 2024-08-29

## 2024-08-29 RX ORDER — LISINOPRIL 20 MG/1
20 TABLET ORAL DAILY
Qty: 30 TABLET | Refills: 0 | OUTPATIENT
Start: 2024-08-29

## 2024-08-29 NOTE — TELEPHONE ENCOUNTER
Rx's Denied - Refilled on 8/23/24, 8/26/24, 30/0    Next Appointment on 9/24/24    Pt Needs to Complete OV to get another refill.

## 2024-09-18 DIAGNOSIS — I10 ESSENTIAL HYPERTENSION: ICD-10-CM

## 2024-09-18 RX ORDER — LISINOPRIL 20 MG/1
20 TABLET ORAL DAILY
Qty: 30 TABLET | Refills: 0 | Status: SHIPPED | OUTPATIENT
Start: 2024-09-18

## 2024-09-18 NOTE — TELEPHONE ENCOUNTER
Last OV relevant to medication: 8/2/23  Last refill date: 8/23/24 #30/refills: 0  When pt was asked to return for OV: 8/2/24, sooner for acute visits  Upcoming appt/reason:   Future Appointments   Date Time Provider Department Center   9/24/2024  8:00 AM Ana Maria Martínez APRN [facility]   Was pt informed of any over due labs: utd  Lab Results   Component Value Date     (H) 07/26/2023    BUN 17 07/26/2023    BUNCREA 17.9 07/26/2023    CREATSERUM 0.95 07/26/2023    ANIONGAP 7 07/26/2023     01/31/2018    GFRNAA 108 06/02/2022    GFRAA 125 06/02/2022    CA 9.1 07/26/2023    OSMOCALC 294 07/26/2023    ALKPHO 97 07/26/2023    AST 14 (L) 07/26/2023    ALT 29 07/26/2023    ALKPHOS 82 04/26/2016    BILT 0.5 07/26/2023    TP 7.5 07/26/2023    ALB 3.8 07/26/2023    GLOBULIN 3.7 07/26/2023    AGRATIO 1.7 06/23/2020     07/26/2023    K 3.7 07/26/2023     07/26/2023    CO2 26.0 07/26/2023

## 2024-09-24 ENCOUNTER — OFFICE VISIT (OUTPATIENT)
Dept: INTERNAL MEDICINE CLINIC | Facility: CLINIC | Age: 39
End: 2024-09-24
Payer: COMMERCIAL

## 2024-09-24 ENCOUNTER — LAB ENCOUNTER (OUTPATIENT)
Dept: LAB | Age: 39
End: 2024-09-24
Attending: NURSE PRACTITIONER
Payer: COMMERCIAL

## 2024-09-24 VITALS
TEMPERATURE: 97 F | HEIGHT: 69 IN | OXYGEN SATURATION: 99 % | HEART RATE: 103 BPM | BODY MASS INDEX: 36.86 KG/M2 | DIASTOLIC BLOOD PRESSURE: 80 MMHG | SYSTOLIC BLOOD PRESSURE: 130 MMHG | WEIGHT: 248.88 LBS | RESPIRATION RATE: 16 BRPM

## 2024-09-24 DIAGNOSIS — F41.9 ANXIETY: ICD-10-CM

## 2024-09-24 DIAGNOSIS — Z72.0 TOBACCO USE: ICD-10-CM

## 2024-09-24 DIAGNOSIS — Z13.220 SCREENING FOR CHOLESTEROL LEVEL: ICD-10-CM

## 2024-09-24 DIAGNOSIS — K21.9 GASTROESOPHAGEAL REFLUX DISEASE, UNSPECIFIED WHETHER ESOPHAGITIS PRESENT: ICD-10-CM

## 2024-09-24 DIAGNOSIS — Z13.29 SCREENING FOR THYROID DISORDER: ICD-10-CM

## 2024-09-24 DIAGNOSIS — I10 ESSENTIAL HYPERTENSION: ICD-10-CM

## 2024-09-24 DIAGNOSIS — Z00.00 ENCOUNTER FOR ANNUAL PHYSICAL EXAM: Primary | ICD-10-CM

## 2024-09-24 DIAGNOSIS — Z00.00 ENCOUNTER FOR ANNUAL PHYSICAL EXAM: ICD-10-CM

## 2024-09-24 DIAGNOSIS — Z13.1 SCREENING FOR DIABETES MELLITUS: ICD-10-CM

## 2024-09-24 LAB
ALBUMIN SERPL-MCNC: 4.9 G/DL (ref 3.2–4.8)
ALBUMIN/GLOB SERPL: 1.8 {RATIO} (ref 1–2)
ALP LIVER SERPL-CCNC: 107 U/L
ALT SERPL-CCNC: 31 U/L
ANION GAP SERPL CALC-SCNC: 7 MMOL/L (ref 0–18)
AST SERPL-CCNC: 20 U/L (ref ?–34)
BASOPHILS # BLD AUTO: 0.05 X10(3) UL (ref 0–0.2)
BASOPHILS NFR BLD AUTO: 1 %
BILIRUB SERPL-MCNC: 0.7 MG/DL (ref 0.3–1.2)
BUN BLD-MCNC: 12 MG/DL (ref 9–23)
CALCIUM BLD-MCNC: 10 MG/DL (ref 8.7–10.4)
CHLORIDE SERPL-SCNC: 106 MMOL/L (ref 98–112)
CHOLEST SERPL-MCNC: 166 MG/DL (ref ?–200)
CO2 SERPL-SCNC: 26 MMOL/L (ref 21–32)
CREAT BLD-MCNC: 0.89 MG/DL
EGFRCR SERPLBLD CKD-EPI 2021: 110 ML/MIN/1.73M2 (ref 60–?)
EOSINOPHIL # BLD AUTO: 0.09 X10(3) UL (ref 0–0.7)
EOSINOPHIL NFR BLD AUTO: 1.7 %
ERYTHROCYTE [DISTWIDTH] IN BLOOD BY AUTOMATED COUNT: 12.1 %
EST. AVERAGE GLUCOSE BLD GHB EST-MCNC: 114 MG/DL (ref 68–126)
FASTING PATIENT LIPID ANSWER: YES
FASTING STATUS PATIENT QL REPORTED: YES
GLOBULIN PLAS-MCNC: 2.8 G/DL (ref 2–3.5)
GLUCOSE BLD-MCNC: 121 MG/DL (ref 70–99)
HBA1C MFR BLD: 5.6 % (ref ?–5.7)
HCT VFR BLD AUTO: 49.1 %
HDLC SERPL-MCNC: 45 MG/DL (ref 40–59)
HGB BLD-MCNC: 16.8 G/DL
IMM GRANULOCYTES # BLD AUTO: 0.04 X10(3) UL (ref 0–1)
IMM GRANULOCYTES NFR BLD: 0.8 %
LDLC SERPL CALC-MCNC: 110 MG/DL (ref ?–100)
LYMPHOCYTES # BLD AUTO: 1.41 X10(3) UL (ref 1–4)
LYMPHOCYTES NFR BLD AUTO: 27 %
MCH RBC QN AUTO: 30.9 PG (ref 26–34)
MCHC RBC AUTO-ENTMCNC: 34.2 G/DL (ref 31–37)
MCV RBC AUTO: 90.3 FL
MONOCYTES # BLD AUTO: 0.44 X10(3) UL (ref 0.1–1)
MONOCYTES NFR BLD AUTO: 8.4 %
NEUTROPHILS # BLD AUTO: 3.2 X10 (3) UL (ref 1.5–7.7)
NEUTROPHILS # BLD AUTO: 3.2 X10(3) UL (ref 1.5–7.7)
NEUTROPHILS NFR BLD AUTO: 61.1 %
NONHDLC SERPL-MCNC: 121 MG/DL (ref ?–130)
OSMOLALITY SERPL CALC.SUM OF ELEC: 289 MOSM/KG (ref 275–295)
PLATELET # BLD AUTO: 252 10(3)UL (ref 150–450)
POTASSIUM SERPL-SCNC: 4.2 MMOL/L (ref 3.5–5.1)
PROT SERPL-MCNC: 7.7 G/DL (ref 5.7–8.2)
RBC # BLD AUTO: 5.44 X10(6)UL
SODIUM SERPL-SCNC: 139 MMOL/L (ref 136–145)
TRIGL SERPL-MCNC: 53 MG/DL (ref 30–149)
TSI SER-ACNC: 0.68 MIU/ML (ref 0.55–4.78)
VLDLC SERPL CALC-MCNC: 9 MG/DL (ref 0–30)
WBC # BLD AUTO: 5.2 X10(3) UL (ref 4–11)

## 2024-09-24 PROCEDURE — 36415 COLL VENOUS BLD VENIPUNCTURE: CPT

## 2024-09-24 PROCEDURE — 83036 HEMOGLOBIN GLYCOSYLATED A1C: CPT

## 2024-09-24 PROCEDURE — 99396 PREV VISIT EST AGE 40-64: CPT | Performed by: NURSE PRACTITIONER

## 2024-09-24 PROCEDURE — 84443 ASSAY THYROID STIM HORMONE: CPT

## 2024-09-24 PROCEDURE — 99214 OFFICE O/P EST MOD 30 MIN: CPT | Performed by: NURSE PRACTITIONER

## 2024-09-24 PROCEDURE — 80061 LIPID PANEL: CPT

## 2024-09-24 PROCEDURE — 80053 COMPREHEN METABOLIC PANEL: CPT

## 2024-09-24 PROCEDURE — 85025 COMPLETE CBC W/AUTO DIFF WBC: CPT

## 2024-09-24 RX ORDER — METOPROLOL SUCCINATE 25 MG/1
25 TABLET, EXTENDED RELEASE ORAL DAILY
Qty: 90 TABLET | Refills: 3 | Status: SHIPPED | OUTPATIENT
Start: 2024-09-24

## 2024-09-24 RX ORDER — PANTOPRAZOLE SODIUM 40 MG/1
40 TABLET, DELAYED RELEASE ORAL
Qty: 90 TABLET | Refills: 2 | Status: SHIPPED | OUTPATIENT
Start: 2024-09-24

## 2024-09-24 RX ORDER — LISINOPRIL 20 MG/1
20 TABLET ORAL DAILY
Qty: 90 TABLET | Refills: 3 | Status: SHIPPED | OUTPATIENT
Start: 2024-09-24

## 2024-09-24 NOTE — PATIENT INSTRUCTIONS
COVID vaccine recommended     Flu shot recommended.  Mid October.     Healthy low sugar, exercise more. [employer] mitali    Stop smoking     Get your labs done in 1 year. You should be fasting for at least 10 hours. If you take a multivitamin with Biotin or any biotin product it should be held for 3 days prior to getting your labs done.     Continue your current medications    Change the pantoprazole to as needed.     See psychiatry     Follow up in 1 year or sooner as needed    Tips to Control Acid Reflux    To control acid reflux, you’ll need to make some basic diet and lifestyle changes. The simple steps outlined below may be all you’ll need to ease discomfort.  Watch what you eat  Avoid fatty foods and spicy foods.  Eat fewer acidic foods, such as citrus and tomato-based foods. These can increase symptoms.  Limit drinking alcohol, caffeine, and fizzy beverages. All increase acid reflux.  Try limiting chocolate, peppermint, and spearmint. These can worsen acid reflux in some people.  Watch when you eat  Avoid lying down for 3 hours after eating.  Do not snack before going to bed.  Raise your head  Raising your head and upper body by 4 to 6 inches helps limit reflux when you’re lying down. Put blocks under the head of your bed frame to raise it.  Other changes  Lose weight, if you need to  Don’t exercise near bedtime  Avoid tight-fitting clothes  Limit aspirin and ibuprofen  Stop smoking   Date Last Reviewed: 7/1/2016  © 2251-1878 [employer], "[employer]. [address] [place] [address], [place] [address]. All rights reserved. This information is not intended as a substitute for professional medical care. Always follow your healthcare professional's instructions.      PAST SURGICAL HISTORY:  H/O Ce-Taussig shunt     S/P TOF (tetralogy of Fallot) repair

## 2024-09-24 NOTE — PROGRESS NOTES
CHIEF COMPLAINT   Dilip Cohen is a 39 year old male who presents for a complete physical exam, med check       HPI:   Here for physical, med check    Wt Readings from Last 6 Encounters:   09/24/24 248 lb 14.4 oz (112.9 kg)   02/12/24 262 lb 9.6 oz (119.1 kg)   01/11/24 254 lb (115.2 kg)   08/02/23 246 lb (111.6 kg)   06/02/22 246 lb 1.6 oz (111.6 kg)   01/03/22 248 lb 6.4 oz (112.7 kg)     Body mass index is 36.76 kg/m².     Diet and exercise are fair to good. Vaccines reviewed. Wearing seat belt and no texting and driving. Feels safe at home. Is smoking at this time- does want to quit. 1 pack per week. He has about a 8 pack year. Moderation of alcohol. Going to the dentist. No skin concerns.  Labs reviewed.     HTN- - Stable. No headaches or vision changes. No SE to medication. Taking medication as prescribed.     GERD- stable with acid reflux.  PPI once every few weeks.     Anxiety- is present. not on medication but doing coping mechanisms. Not having frequent panic attacks anymore. He would like eval for anxiety and possible ADHD.     Cholesterol, Total (mg/dL)   Date Value   07/26/2023 154   06/02/2022 182   02/03/2021 154     CHOLESTEROL, TOTAL (mg/dL)   Date Value   06/23/2020 172     HDL Cholesterol (mg/dL)   Date Value   07/26/2023 40   06/02/2022 45   02/03/2021 47     HDL CHOLESTEROL (mg/dL)   Date Value   06/23/2020 45     LDL Cholesterol (mg/dL)   Date Value   07/26/2023 101 (H)   06/02/2022 125 (H)   02/03/2021 96     LDL-CHOLESTEROL (mg/dL (calc))   Date Value   06/23/2020 109 (H)     AST (U/L)   Date Value   07/26/2023 14 (L)   06/02/2022 19   02/03/2021 25   06/23/2020 21     ALT (U/L)   Date Value   07/26/2023 29   06/02/2022 34   02/03/2021 43   06/23/2020 27      Current Outpatient Medications   Medication Sig Dispense Refill    LISINOPRIL 20 MG Oral Tab TAKE ONE TABLET BY MOUTH ONE TIME DAILY 30 tablet 0    metoprolol succinate ER 25 MG Oral Tablet 24 Hr Take 1 tablet (25 mg total) by mouth  daily. 30 tablet 0    Bacillus Coagulans-Inulin (PROBIOTIC FORMULA OR) Take by mouth.      pantoprazole 40 MG Oral Tab EC Take 1 tablet (40 mg total) by mouth daily as needed. 90 tablet 2    Multiple Vitamin (MULTI VITAMIN MENS OR) Take 1 tablet by mouth daily.      Ascorbic Acid (VITAMIN C) 1000 MG Oral Tab Take 1 tablet (1,000 mg total) by mouth daily.        No Known Allergies   Past Medical History:    Essential hypertension    Hypertension      Past Surgical History:   Procedure Laterality Date    Umbilical hernia repair        Family History   Problem Relation Age of Onset    Lipids Father         hyperlipidemia    Hypertension Mother     Depression Mother     Pacemaker Other         grandmother    Heart Disorder Son     Hypertension Son     No Known Problems Maternal Grandfather     No Known Problems Paternal Grandmother     No Known Problems Paternal Grandfather     No Known Problems Sister     No Known Problems Brother       Social History:  Social History     Socioeconomic History    Marital status:    Tobacco Use    Smoking status: Former     Current packs/day: 0.00     Types: Cigarettes     Quit date: 2022     Years since quittin.6    Smokeless tobacco: Never    Tobacco comments:     Pt stopped on his own   Vaping Use    Vaping status: Former    Substances: Nicotine, Flavoring    Devices: Pre-filled or refillable cartridge   Substance and Sexual Activity    Alcohol use: Yes     Comment: Socially     Drug use: No   Other Topics Concern    Caffeine Concern Yes     Comment: coffee, 1cup/day    Stress Concern No    Weight Concern No    Special Diet Yes     Comment: Stay away from greasy foods    Exercise Yes     Comment: NRR    Seat Belt Yes         REVIEW OF SYSTEMS:   GENERAL: feels well otherwise  SKIN: no complaint of any unusual skin lesions  EYES: no complaint of blurred vision or double vision  HEENT: no complaint of nasal congestion, sinus pain or ST  LUNGS: no complaint of shortness  of breath with exertion  CARDIOVASCULAR: no complaint of chest pain on exertion  GI: no complaint of pain   : no complaint of nocturia or changes in stream  MUSCULOSKELETAL: no complaint of back pain  NEURO: no complaint of headaches  PSYCHE: no complaint of depression   HEMATOLOGIC: denies hx of anemia    EXAM:   /80 (BP Location: Left arm, Patient Position: Sitting, Cuff Size: large)   Pulse 103   Temp 97.2 °F (36.2 °C) (Temporal)   Resp 16   Ht 5' 9\" (1.753 m)   Wt 248 lb 14.4 oz (112.9 kg)   SpO2 99%   BMI 36.76 kg/m²   Body mass index is 36.76 kg/m².   GENERAL: well developed, well nourished,in no apparent distress  SKIN: no rashes, no suspicious lesions  HEENT: atraumatic, normocephalic,ears are clear  EYES:PERRLA, EOMI, conjunctiva are clear  NECK: supple,no adenopathy, no thyromegaly   LUNGS: clear to auscultation  CARDIO: RRR without murmur  GI: Nondistended.  Nontender. No masses.  No organomegaly.  : deferred - denies issues  MUSCULOSKELETAL: back is not tender,FROM of the back  EXTREMITIES: no cyanosis, clubbing or edema  NEURO: Oriented times three,cranial nerves are grossly intact,motor and sensory are grossly intact  PSYCH: nervous at times.     LABS:     Lab Results   Component Value Date    WBC 6.4 07/26/2023    RBC 5.20 07/26/2023    HGB 16.0 07/26/2023    HCT 47.3 07/26/2023    MCV 91.0 07/26/2023    MCH 30.8 07/26/2023    MCHC 33.8 07/26/2023    RDW 11.7 07/26/2023    .0 07/26/2023      Lab Results   Component Value Date     (H) 07/26/2023    BUN 17 07/26/2023    BUNCREA 17.9 07/26/2023    CREATSERUM 0.95 07/26/2023    ANIONGAP 7 07/26/2023     01/31/2018    GFRNAA 108 06/02/2022    GFRAA 125 06/02/2022    CA 9.1 07/26/2023    OSMOCALC 294 07/26/2023    ALKPHO 97 07/26/2023    AST 14 (L) 07/26/2023    ALT 29 07/26/2023    ALKPHOS 82 04/26/2016    BILT 0.5 07/26/2023    TP 7.5 07/26/2023    ALB 3.8 07/26/2023    GLOBULIN 3.7 07/26/2023    AGRATIO 1.7  06/23/2020     07/26/2023    K 3.7 07/26/2023     07/26/2023    CO2 26.0 07/26/2023      Lab Results   Component Value Date    CHOLEST 154 07/26/2023    TRIG 67 07/26/2023    HDL 40 07/26/2023     (H) 07/26/2023    VLDL 11 07/26/2023    TCHDLRATIO 3.8 06/23/2020    NONHDLC 114 07/26/2023    CALCNONHDL 114 04/26/2016      Lab Results   Component Value Date    TSH 0.963 07/26/2023    TSHT4 0.98 06/23/2020      Lab Results   Component Value Date     07/26/2023    A1C 5.4 07/26/2023     ASSESSMENT AND PLAN:   1. Encounter for annual physical exam  - Dilip Cohen is a 38 year old male who presents for a complete physical exam. Pt's weight is Body mass index is 36.33 kg/m², recommended low fat diet and aerobic exercise 30 minutes three times weekly.  Labs reviewed/ordered. Vaccines reviewed.  - CBC With Differential With Platelet; Future  - Comp Metabolic Panel (14); Future    2. Essential hypertension  - Stable. No headaches or vision changes. No SE to medication. Taking medication as prescribed.   - continue current medication   - lisinopril 20 MG Oral Tab; Take 1 tablet (20 mg total) by mouth daily.  Dispense: 90 tablet; Refill: 3  - metoprolol succinate ER 25 MG Oral Tablet 24 Hr; Take 1 tablet (25 mg total) by mouth daily.  Dispense: 90 tablet; Refill: 3    3. Gastroesophageal reflux disease, unspecified whether esophagitis present  - stable. Continue current management.  - gerd prec  - pantoprazole 40 MG Oral Tab EC; Take 1 tablet (40 mg total) by mouth daily as needed.  Dispense: 90 tablet; Refill: 2    4. Anxiety  - stable but still present.  - continue coping mechanisms, stress reduction  - see psych for eval of anxiety possible ADHD  -  NAVIGATOR    5. Tobacco use  - advised to stop smoking    6. Screening for cholesterol level  - Lipid Panel; Future    7. Screening for thyroid disorder  - TSH W Reflex To Free T4; Future    8. Screening for diabetes mellitus  - Hemoglobin A1C;  Future        The patient indicates understanding of these issues and agrees to the plan.  The patient is asked to return in 1 year for a complete physical, med check or sooner as needed

## 2024-09-30 ENCOUNTER — TELEPHONE (OUTPATIENT)
Age: 39
End: 2024-09-30

## 2025-08-23 DIAGNOSIS — K21.9 GASTROESOPHAGEAL REFLUX DISEASE, UNSPECIFIED WHETHER ESOPHAGITIS PRESENT: ICD-10-CM

## 2025-08-26 RX ORDER — PANTOPRAZOLE SODIUM 40 MG/1
40 TABLET, DELAYED RELEASE ORAL
Qty: 90 TABLET | Refills: 0 | OUTPATIENT
Start: 2025-08-26

## (undated) DIAGNOSIS — I10 ESSENTIAL HYPERTENSION: ICD-10-CM

## (undated) NOTE — MR AVS SNAPSHOT
[address], [address]  [address] [facility]  [phone]               Thank you for choosing us for your health care visit with Ramiro Monson MD.  We are glad to serve you and happy to provide you with this summary - Pantoprazole Sodium 40 MG Tbec  - RaNITidine HCl 300 MG Tabs            MyChart                  Visit [facility] online at  [email or web address]tn

## (undated) NOTE — MR AVS SNAPSHOT
[address]  [address] [facility]  [phone]               Thank you for choosing us for your health care visit with Savanna Gibbons MD.  We are glad to serve you and happy to provide you with this summary - RaNITidine HCl 300 MG Tabs            Referral Information     Referral Order Referred to  Jenny Mendez Phone Visits Status Diagnosis           Gastroesophageal reflux disease, esophagitis presence not specified [[id number]]                 MyChart     Sign Women and lighter weight persons: limit to <= 1 drink* per day. Healthy Diet and Regular Exercise  [employer] [address] for making healthy food choices  -   Enjoy your food, but eat less.   Fully enjoy your food when ea